# Patient Record
Sex: MALE | Race: WHITE | NOT HISPANIC OR LATINO | Employment: FULL TIME | URBAN - METROPOLITAN AREA
[De-identification: names, ages, dates, MRNs, and addresses within clinical notes are randomized per-mention and may not be internally consistent; named-entity substitution may affect disease eponyms.]

---

## 2018-03-14 ENCOUNTER — HOSPITAL ENCOUNTER (EMERGENCY)
Facility: HOSPITAL | Age: 53
Discharge: HOME | End: 2018-03-14
Attending: EMERGENCY MEDICINE

## 2018-03-14 VITALS
OXYGEN SATURATION: 96 % | SYSTOLIC BLOOD PRESSURE: 133 MMHG | WEIGHT: 125 LBS | RESPIRATION RATE: 16 BRPM | HEIGHT: 67 IN | BODY MASS INDEX: 19.62 KG/M2 | TEMPERATURE: 98 F | DIASTOLIC BLOOD PRESSURE: 84 MMHG | HEART RATE: 78 BPM

## 2018-03-14 DIAGNOSIS — R05.9 COUGH: ICD-10-CM

## 2018-03-14 DIAGNOSIS — R07.9 CHEST PAIN: ICD-10-CM

## 2018-03-14 DIAGNOSIS — R07.9 CHEST PAIN, UNSPECIFIED TYPE: Primary | ICD-10-CM

## 2018-03-14 LAB
ALBUMIN SERPL BCP-MCNC: 4.1 G/DL
ALP SERPL-CCNC: 45 U/L
ALT SERPL W/O P-5'-P-CCNC: 51 U/L
ANION GAP SERPL CALC-SCNC: 11 MMOL/L
AST SERPL-CCNC: 42 U/L
BASOPHILS # BLD AUTO: 0.03 K/UL
BASOPHILS NFR BLD: 0.4 %
BILIRUB SERPL-MCNC: 0.8 MG/DL
BUN SERPL-MCNC: 8 MG/DL
CALCIUM SERPL-MCNC: 9.8 MG/DL
CHLORIDE SERPL-SCNC: 102 MMOL/L
CO2 SERPL-SCNC: 26 MMOL/L
CREAT SERPL-MCNC: 0.7 MG/DL
D DIMER PPP IA.FEU-MCNC: 0.46 MG/L FEU
DIFFERENTIAL METHOD: ABNORMAL
EOSINOPHIL # BLD AUTO: 0.2 K/UL
EOSINOPHIL NFR BLD: 3 %
ERYTHROCYTE [DISTWIDTH] IN BLOOD BY AUTOMATED COUNT: 13.4 %
EST. GFR  (AFRICAN AMERICAN): >60 ML/MIN/1.73 M^2
EST. GFR  (NON AFRICAN AMERICAN): >60 ML/MIN/1.73 M^2
GLUCOSE SERPL-MCNC: 93 MG/DL
HCT VFR BLD AUTO: 47.1 %
HGB BLD-MCNC: 16.8 G/DL
LYMPHOCYTES # BLD AUTO: 3.1 K/UL
LYMPHOCYTES NFR BLD: 44.2 %
MCH RBC QN AUTO: 34.2 PG
MCHC RBC AUTO-ENTMCNC: 35.7 G/DL
MCV RBC AUTO: 96 FL
MONOCYTES # BLD AUTO: 0.6 K/UL
MONOCYTES NFR BLD: 9 %
NEUTROPHILS # BLD AUTO: 3.1 K/UL
NEUTROPHILS NFR BLD: 43.1 %
PLATELET # BLD AUTO: 203 K/UL
PMV BLD AUTO: 10.5 FL
POTASSIUM SERPL-SCNC: 3.9 MMOL/L
PROT SERPL-MCNC: 7.8 G/DL
RBC # BLD AUTO: 4.91 M/UL
SODIUM SERPL-SCNC: 139 MMOL/L
TROPONIN I SERPL DL<=0.01 NG/ML-MCNC: <0.006 NG/ML
WBC # BLD AUTO: 7.08 K/UL

## 2018-03-14 PROCEDURE — 93010 ELECTROCARDIOGRAM REPORT: CPT | Mod: 76,,, | Performed by: INTERNAL MEDICINE

## 2018-03-14 PROCEDURE — 93005 ELECTROCARDIOGRAM TRACING: CPT

## 2018-03-14 PROCEDURE — 85379 FIBRIN DEGRADATION QUANT: CPT

## 2018-03-14 PROCEDURE — 93010 ELECTROCARDIOGRAM REPORT: CPT | Mod: ,,, | Performed by: INTERNAL MEDICINE

## 2018-03-14 PROCEDURE — 80053 COMPREHEN METABOLIC PANEL: CPT

## 2018-03-14 PROCEDURE — 85025 COMPLETE CBC W/AUTO DIFF WBC: CPT

## 2018-03-14 PROCEDURE — 84484 ASSAY OF TROPONIN QUANT: CPT

## 2018-03-14 PROCEDURE — 25000003 PHARM REV CODE 250: Performed by: NURSE PRACTITIONER

## 2018-03-14 PROCEDURE — 99284 EMERGENCY DEPT VISIT MOD MDM: CPT

## 2018-03-14 RX ORDER — KETOROLAC TROMETHAMINE 10 MG/1
10 TABLET, FILM COATED ORAL
Status: COMPLETED | OUTPATIENT
Start: 2018-03-14 | End: 2018-03-14

## 2018-03-14 RX ADMIN — KETOROLAC TROMETHAMINE 10 MG: 10 TABLET, FILM COATED ORAL at 02:03

## 2018-03-14 NOTE — ED PROVIDER NOTES
"Encounter Date: 3/14/2018       History     Chief Complaint   Patient presents with    Chest Pain     c/o right-sided chest pain x1 week     Pt is a 52-year-old male with pmhx of alcohol abuse and Hep C who presents today with CP x 1 week. Pt reports R-sided CP that is constant and non-radiating. He denies any exacerbating or alleviating factors. He does report the pain worsens with certain movements.  He denies any cardiac hx but states that his mother had a heart attack last year. He denies any injury or trauma. He does report an associated symptom of a cough x 2 weeks, denies SOB. He is a smoker. Pt also reports drinking "3-4 whiskey and cokes on a daily basis." Admits to drinking one whiskey and coke this am. Pt denies dizziness, weakness, N/V/D, abdominal pain, leg swelling, and dysuria. Pt denies any other complaints at this time.        The history is provided by the patient.     Review of patient's allergies indicates:   Allergen Reactions    Penicillins Rash     Past Medical History:   Diagnosis Date    Alcohol abuse     Hepatitis C      History reviewed. No pertinent surgical history.  History reviewed. No pertinent family history.  Social History   Substance Use Topics    Smoking status: Current Every Day Smoker     Packs/day: 0.50     Types: Cigarettes    Smokeless tobacco: Never Used    Alcohol use 14.4 oz/week     24 Cans of beer per week      Comment: whiskey daily     Review of Systems   Constitutional: Negative for activity change, chills, diaphoresis and fever.   Respiratory: Positive for cough. Negative for chest tightness and shortness of breath.    Cardiovascular: Positive for chest pain. Negative for palpitations and leg swelling.   Gastrointestinal: Negative for abdominal pain, diarrhea, nausea and vomiting.   Genitourinary: Negative for difficulty urinating and dysuria.   Musculoskeletal: Negative for back pain and gait problem.   Skin: Negative for pallor and rash.   Neurological: " Negative for dizziness, syncope, weakness, light-headedness and headaches.   Hematological: Does not bruise/bleed easily.   All other systems reviewed and are negative.      Physical Exam     Initial Vitals [03/14/18 1213]   BP Pulse Resp Temp SpO2   133/89 89 20 97.7 °F (36.5 °C) 98 %      MAP       103.67         Physical Exam    Nursing note and vitals reviewed.  Constitutional: Vital signs are normal. He appears well-developed and well-nourished. He is not diaphoretic. He is active.  Non-toxic appearance. He does not have a sickly appearance. He does not appear ill. No distress.   HENT:   Head: Normocephalic.   Mouth/Throat: Oropharynx is clear and moist.   Eyes: Conjunctivae are normal.   Neck: Trachea normal, normal range of motion and phonation normal. Neck supple.   Cardiovascular: Normal rate, regular rhythm, normal heart sounds and normal pulses.   Pulses:       Radial pulses are 2+ on the right side, and 2+ on the left side.   Pulmonary/Chest: Effort normal and breath sounds normal. No respiratory distress. He has no decreased breath sounds. He has no wheezes. He has no rhonchi. He has no rales.   Abdominal: Soft. Normal appearance and bowel sounds are normal. There is no tenderness.   Musculoskeletal: Normal range of motion. He exhibits no edema or tenderness.   No calf pain or swelling   Neurological: He is alert and oriented to person, place, and time. He has normal strength. Gait normal. GCS eye subscore is 4. GCS verbal subscore is 5. GCS motor subscore is 6.   Skin: Skin is warm, dry and intact. Capillary refill takes less than 2 seconds. No rash noted.   Psychiatric: He has a normal mood and affect. His speech is normal and behavior is normal.         ED Course   Procedures  Labs Reviewed   CBC W/ AUTO DIFFERENTIAL   COMPREHENSIVE METABOLIC PANEL   TROPONIN I   D DIMER, QUANTITATIVE     EKG Readings: (Independently Interpreted)   Initial Reading: No STEMI. Rhythm: Normal Sinus Rhythm. Heart Rate:  83. Ectopy: No Ectopy. Clinical Impression: Normal Sinus Rhythm      Imaging Results          X-Ray Chest PA And Lateral (Final result)  Result time 03/14/18 14:21:55    Final result by Luciana Suarez MD (03/14/18 14:21:55)                 Impression:        No evidence of active cardiopulmonary disease.      Electronically signed by: LUCIANA SUAREZ MD  Date:     03/14/18  Time:    14:21              Narrative:    XR Chest    03/14/18 13:04:55    Accession# [id number]    CLINICAL INDICATION: 52 year old M with  cough    TECHNIQUE: PA and lateral radiographs of the chest.    COMPARISON:  6/1/14    FINDINGS:    Multiple overlying cardiac monitoring leads. The cardiomediastinal silhouette is normal in size and midline. Pulmonary vascularity appears within normal limits.    The lungs appear clear without confluent pulmonary parenchymal opacity. No pleural fluid.    Osseous structures appear intact.                              X-Rays:   Independently Interpreted Readings:   Chest X-Ray: Normal heart size.  No infiltrates.  No acute abnormalities.     Medical Decision Making:   Initial Assessment:   52-year-old male with pmhx of alcohol abuse and Hep C who presents today with right sided cp x 1 week. Pt non-ill, non-toxic appearing. Pt in NAD. Normal cardiac exam. Normal vital signs. No edema noted. Lungs CTA. No respiratory distress noted.  Differential Diagnosis:   Costochondritis, strain, spasm, Pneumonia, pneumothorax, pericarditis, pulmonary embolism, AAA, dysrhythmia, STEMI, non-STEMI   Independently Interpreted Test(s):   I have ordered and independently interpreted X-rays - see prior notes.  I have ordered and independently interpreted EKG Reading(s) - see prior notes  Clinical Tests:   Lab Tests: Ordered and Reviewed  Radiological Study: Ordered and Reviewed  Medical Tests: Ordered and Reviewed  ED Management:  Chest xray, EKG, labs, toradol, IV  EKG, labs, and chest xray normal. Pt states that the pain has  ""eased up" with toradol. Pt's mid-sternal CP most likely musculoskeletal. Pt instructed to take ibuprofen/tylenol as labeled and as needed for pain. Pt instructed to f/u with PCP within 2-3 days and return to ED if the symptoms worsen or change, such as increased chest pain, shortness of breath, or if pt cannot keep any fluids down. Pt verbalized d/c instructions and is in compliance and agreement with tx plan.    Additional MDM:   Heart Score:    History:          Slightly suspicious.  ECG:             Normal  Age:               45-65 years  Risk factors: 1-2 risk factors  Troponin:       Less than or equal to normal limit  Final Score: 2               Attending Attestation:     Physician Attestation Statement for NP/PA:   I have conducted a face to face encounter with this patient in addition to the NP/PA, due to    Other NP/PA Attestation Additions:    History of Present Illness: Agree:  Right sided chest pain that has been intermittent for the last week.  Pt reports the pain has been lasting atleast 8 hours per episode and will improve at night.  The pain returned last night and has been present since then with no pain free intervals.  No sob, wheezing, diaphoresis, palpitations, hemoptysis.  Pt reports "it feels like a muscle."     Physical Exam: agree   Medical Decision Making: Agree:  Pt work up in ED is neg for acute changes.  His pain has been constant since last night and he has no EKG changes and neg trop.  For these reasons, I do not feel his pain is cardiac in nature. He feels better after toradol.  He is established with [facility] but has not recently been seen.  He will call today to schedule an outpatient appt for f/u.  He understands that he must return to ED immediately with any worsening of symptoms.                      Clinical Impression:   The primary encounter diagnosis was Chest pain, unspecified type. Diagnoses of Cough and Chest pain were also pertinent to this " visit.    Disposition:   Disposition: Discharged  Condition: Stable                        Lowell Krueger NP  03/14/18 150       Eufemia Chen MD  03/14/18 2406

## 2018-03-14 NOTE — ED PROVIDER NOTES
Encounter Date: 3/14/2018    SCRIBE #1 NOTE: I, Jorge Alvarez, am scribing for, and in the presence of,  Dr. Eufemia Chen. I have scribed the entire note.       History     Chief Complaint   Patient presents with    Chest Pain     c/o right-sided chest pain x1 week     HPI  Review of patient's allergies indicates:   Allergen Reactions    Penicillins Rash     Past Medical History:   Diagnosis Date    Alcohol abuse     Hepatitis C      History reviewed. No pertinent surgical history.  No family history on file.  Social History   Substance Use Topics    Smoking status: Current Every Day Smoker     Packs/day: 0.50     Types: Cigarettes    Smokeless tobacco: Never Used    Alcohol use 14.4 oz/week     24 Cans of beer per week      Comment: whiskey daily     Review of Systems   Constitutional: Negative for fever.   HENT: Negative for sore throat.    Respiratory: Negative for shortness of breath.    Cardiovascular: Negative for chest pain.   Gastrointestinal: Negative for nausea.   Genitourinary: Negative for dysuria.   Musculoskeletal: Negative for back pain.   Skin: Negative for rash.   Neurological: Negative for weakness.   Hematological: Does not bruise/bleed easily.       Physical Exam     Initial Vitals [03/14/18 1213]   BP Pulse Resp Temp SpO2   133/89 89 20 97.7 °F (36.5 °C) 98 %      MAP       103.67         Physical Exam    Nursing note and vitals reviewed.  Constitutional: He appears well-developed and well-nourished. He is not diaphoretic. No distress.   HENT:   Head: Normocephalic and atraumatic.   Eyes: Conjunctivae are normal.   Neck: Normal range of motion. Neck supple.   Pulmonary/Chest: No respiratory distress.   Abdominal: He exhibits no distension.   Musculoskeletal: Normal range of motion.   Neurological: He is alert and oriented to person, place, and time.   Skin: Skin is dry.   Psychiatric: He has a normal mood and affect.         ED Course   Procedures  Labs Reviewed - No data to display    "                    Attending Attestation:     Physician Attestation Statement for NP/PA:   I have conducted a face to face encounter with this patient in addition to the NP/PA, due to Medical Complexity    Other NP/PA Attestation Additions:    History of Present Illness: Agree:  Right sided chest pain that has been intermittent for the last week.  Pt reports the pain has been lasting atleast 8 hours per episode and will improve at night.  The pain returned last night and has been present since then with no pain free intervals.  No sob, wheezing, diaphoresis, palpitations, hemoptysis.  Pt reports "it feels like a muscle."    Physical Exam: Agree   Medical Decision Making: Agree                    Clinical Impression:   There were no encounter diagnoses.     ***                   "

## 2018-03-14 NOTE — DISCHARGE INSTRUCTIONS
Please take ibuprofen/tylenol as labeled and as needed for pain. Please follow-up with your PCP within 2-3 days and return if symptoms worsen or change, such as increased chest pain, shortness of breath, or if you cannot keep fluids down.

## 2018-03-15 DIAGNOSIS — R07.9 CHEST PAIN: Primary | ICD-10-CM

## 2020-06-15 ENCOUNTER — HOSPITAL ENCOUNTER (EMERGENCY)
Facility: HOSPITAL | Age: 55
Discharge: HOME | End: 2020-06-15
Attending: EMERGENCY MEDICINE
Payer: MEDICAID

## 2020-06-15 VITALS
SYSTOLIC BLOOD PRESSURE: 132 MMHG | TEMPERATURE: 99 F | BODY MASS INDEX: 22.2 KG/M2 | DIASTOLIC BLOOD PRESSURE: 81 MMHG | HEART RATE: 95 BPM | HEIGHT: 64 IN | OXYGEN SATURATION: 95 % | RESPIRATION RATE: 18 BRPM | WEIGHT: 130 LBS

## 2020-06-15 DIAGNOSIS — S81.811A LACERATION OF RIGHT LOWER EXTREMITY, INITIAL ENCOUNTER: Primary | ICD-10-CM

## 2020-06-15 PROCEDURE — 90715 TDAP VACCINE 7 YRS/> IM: CPT | Performed by: PHYSICIAN ASSISTANT

## 2020-06-15 PROCEDURE — 63600175 PHARM REV CODE 636 W HCPCS: Performed by: PHYSICIAN ASSISTANT

## 2020-06-15 PROCEDURE — 90471 IMMUNIZATION ADMIN: CPT | Performed by: PHYSICIAN ASSISTANT

## 2020-06-15 PROCEDURE — 99284 EMERGENCY DEPT VISIT MOD MDM: CPT | Mod: 25

## 2020-06-15 RX ORDER — MUPIROCIN 20 MG/G
OINTMENT TOPICAL 3 TIMES DAILY
Qty: 15 G | Refills: 0 | Status: SHIPPED | OUTPATIENT
Start: 2020-06-15

## 2020-06-15 RX ADMIN — CLOSTRIDIUM TETANI TOXOID ANTIGEN (FORMALDEHYDE INACTIVATED), CORYNEBACTERIUM DIPHTHERIAE TOXOID ANTIGEN (FORMALDEHYDE INACTIVATED), BORDETELLA PERTUSSIS TOXOID ANTIGEN (GLUTARALDEHYDE INACTIVATED), BORDETELLA PERTUSSIS FILAMENTOUS HEMAGGLUTININ ANTIGEN (FORMALDEHYDE INACTIVATED), BORDETELLA PERTUSSIS PERTACTIN ANTIGEN, AND BORDETELLA PERTUSSIS FIMBRIAE 2/3 ANTIGEN 0.5 ML: 5; 2; 2.5; 5; 3; 5 INJECTION, SUSPENSION INTRAMUSCULAR at 08:06

## 2020-06-15 NOTE — Clinical Note
Armando Green was seen and treated in our emergency department on 6/15/2020.  He may return to work on 06/16/2020.       If you have any questions or concerns, please don't hesitate to call.      MajoRN RN

## 2020-06-15 NOTE — Clinical Note
Armando Green was seen and treated in our emergency department on 6/15/2020.  He may return to work on 06/16/2020.       If you have any questions or concerns, please don't hesitate to call.      Betsy Schmitt PA-C

## 2020-06-15 NOTE — ED TRIAGE NOTES
pt hit his right shin on a picture frame 3 days ago and has been cleaning site with peroxide and applying neosporin. concerned because site is still red and mildly swollen.

## 2020-06-15 NOTE — ED PROVIDER NOTES
Encounter Date: 6/15/2020       History     Chief Complaint   Patient presents with    Leg Injury     pt hit his right shin on a picture frame 3 days ago and has been cleaning site with peroxide and applying neosporin. concerned because site is still red and mildly swollen     Armando Green, a 55 y.o. male  has a past medical history of Alcohol abuse and Hepatitis C.     He presents to the ED evaluation of laceration sustained to the right lower leg after a picture frame fell to site.  States he's been treating at home with peroixde and OTC antibiotic creams.  Concerned because there is some redness to site.  Denies drainage, fevers, swelling.  Unsure of tetanus status.  Requesting     The history is provided by the patient.     Review of patient's allergies indicates:   Allergen Reactions    Penicillins Rash     Past Medical History:   Diagnosis Date    Alcohol abuse     Hepatitis C      Past Surgical History:   Procedure Laterality Date    OTHER SURGICAL HISTORY      right arm surgery     No family history on file.  Social History     Tobacco Use    Smoking status: Current Every Day Smoker     Packs/day: 0.50     Types: Cigarettes    Smokeless tobacco: Never Used   Substance Use Topics    Alcohol use: Yes     Alcohol/week: 24.0 standard drinks     Types: 24 Cans of beer per week     Comment: whiskey daily    Drug use: No     Comment: former pill abuser; denies IVDA     Review of Systems   Constitutional: Negative for fever.   Musculoskeletal: Negative for arthralgias and myalgias.   Skin: Positive for color change and wound.   Allergic/Immunologic: Negative for immunocompromised state.   Hematological: Does not bruise/bleed easily.       Physical Exam     Initial Vitals [06/15/20 0806]   BP Pulse Resp Temp SpO2   132/81 95 18 98.5 °F (36.9 °C) 95 %      MAP       --         Physical Exam    Nursing note and vitals reviewed.  Constitutional: He appears well-developed and well-nourished.   HENT:   Head:  Normocephalic and atraumatic.   Right Ear: External ear normal.   Left Ear: External ear normal.   Eyes: EOM are normal.   Neck: Normal range of motion.   Cardiovascular: Normal rate and regular rhythm.   Pulmonary/Chest: No respiratory distress.   Musculoskeletal: Normal range of motion. No tenderness or edema.   Neurological: He is alert and oriented to person, place, and time. No cranial nerve deficit.   Skin: Skin is warm and dry. Capillary refill takes less than 2 seconds. Laceration noted. No rash and no abscess noted.        Healing linear laceration to right anterior chin.  No drainage or fluctuance.  Very mild erythema    Psychiatric: He has a normal mood and affect. Thought content normal.         ED Course   Procedures  Labs Reviewed - No data to display       Imaging Results    None          Medical Decision Making:   Initial Assessment:   Laceration   Differential Diagnosis:   Laceration simple vs complex vs infected   ED Management:  Pt presents to ED for evaluation of laceration sustained 3 days go.  No significant erythema.  No induration or drainage.  Patient was given information on wound care and given RX for Bactroban.  Tetanus was updated.  Patient requesting work note for previous 3 days, which was declined.  Work note given for today.                                   Clinical Impression:       ICD-10-CM ICD-9-CM   1. Laceration of right lower extremity, initial encounter  S81.811A 894.0             ED Disposition Condition    Discharge Stable        ED Prescriptions     Medication Sig Dispense Start Date End Date Auth. Provider    mupirocin (BACTROBAN) 2 % ointment Apply topically 3 (three) times daily. 15 g 6/15/2020  Betsy Schmitt PA-C        Follow-up Information     Follow up With Specialties Details Why Contact Info    Rick Musa MD Family Medicine   [address] [place] [address] [place] [address]  [phone]                                       Betsy Schmitt PA-C  06/15/20 5398

## 2020-08-06 ENCOUNTER — HOSPITAL ENCOUNTER (EMERGENCY)
Facility: HOSPITAL | Age: 55
Discharge: HOME | End: 2020-08-06
Attending: EMERGENCY MEDICINE
Payer: MEDICAID

## 2020-08-06 VITALS
DIASTOLIC BLOOD PRESSURE: 82 MMHG | SYSTOLIC BLOOD PRESSURE: 134 MMHG | OXYGEN SATURATION: 95 % | BODY MASS INDEX: 23.32 KG/M2 | TEMPERATURE: 99 F | WEIGHT: 140 LBS | HEART RATE: 78 BPM | HEIGHT: 65 IN | RESPIRATION RATE: 20 BRPM

## 2020-08-06 DIAGNOSIS — F10.10 ALCOHOL ABUSE: ICD-10-CM

## 2020-08-06 DIAGNOSIS — F10.930 ALCOHOL WITHDRAWAL SYNDROME WITHOUT COMPLICATION: ICD-10-CM

## 2020-08-06 DIAGNOSIS — F10.20 ALCOHOLISM: Primary | ICD-10-CM

## 2020-08-06 LAB
ALBUMIN SERPL BCP-MCNC: 3.6 G/DL (ref 3.5–5.2)
ALP SERPL-CCNC: 64 U/L (ref 55–135)
ALT SERPL W/O P-5'-P-CCNC: 96 U/L (ref 10–44)
ANION GAP SERPL CALC-SCNC: 11 MMOL/L (ref 8–16)
AST SERPL-CCNC: 116 U/L (ref 10–40)
BASOPHILS # BLD AUTO: 0.04 K/UL (ref 0–0.2)
BASOPHILS NFR BLD: 0.6 % (ref 0–1.9)
BILIRUB SERPL-MCNC: 1.4 MG/DL (ref 0.1–1)
BUN SERPL-MCNC: 20 MG/DL (ref 6–20)
CALCIUM SERPL-MCNC: 9.2 MG/DL (ref 8.7–10.5)
CHLORIDE SERPL-SCNC: 102 MMOL/L (ref 95–110)
CO2 SERPL-SCNC: 29 MMOL/L (ref 23–29)
CREAT SERPL-MCNC: 0.6 MG/DL (ref 0.5–1.4)
DIFFERENTIAL METHOD: ABNORMAL
EOSINOPHIL # BLD AUTO: 0.2 K/UL (ref 0–0.5)
EOSINOPHIL NFR BLD: 2.8 % (ref 0–8)
ERYTHROCYTE [DISTWIDTH] IN BLOOD BY AUTOMATED COUNT: 13 % (ref 11.5–14.5)
EST. GFR  (AFRICAN AMERICAN): >60 ML/MIN/1.73 M^2
EST. GFR  (NON AFRICAN AMERICAN): >60 ML/MIN/1.73 M^2
ETHANOL SERPL-MCNC: <10 MG/DL
GLUCOSE SERPL-MCNC: 167 MG/DL (ref 70–110)
HCT VFR BLD AUTO: 41.1 % (ref 40–54)
HGB BLD-MCNC: 14.5 G/DL (ref 14–18)
IMM GRANULOCYTES # BLD AUTO: 0.02 K/UL (ref 0–0.04)
IMM GRANULOCYTES NFR BLD AUTO: 0.3 % (ref 0–0.5)
LIPASE SERPL-CCNC: 28 U/L (ref 4–60)
LYMPHOCYTES # BLD AUTO: 0.6 K/UL (ref 1–4.8)
LYMPHOCYTES NFR BLD: 8.4 % (ref 18–48)
MAGNESIUM SERPL-MCNC: 1.7 MG/DL (ref 1.6–2.6)
MCH RBC QN AUTO: 34.5 PG (ref 27–31)
MCHC RBC AUTO-ENTMCNC: 35.3 G/DL (ref 32–36)
MCV RBC AUTO: 98 FL (ref 82–98)
MONOCYTES # BLD AUTO: 0.6 K/UL (ref 0.3–1)
MONOCYTES NFR BLD: 9.2 % (ref 4–15)
NEUTROPHILS # BLD AUTO: 5.3 K/UL (ref 1.8–7.7)
NEUTROPHILS NFR BLD: 78.7 % (ref 38–73)
NRBC BLD-RTO: 0 /100 WBC
PLATELET # BLD AUTO: 133 K/UL (ref 150–350)
PMV BLD AUTO: 10.4 FL (ref 9.2–12.9)
POTASSIUM SERPL-SCNC: 3.7 MMOL/L (ref 3.5–5.1)
PROT SERPL-MCNC: 7.4 G/DL (ref 6–8.4)
RBC # BLD AUTO: 4.2 M/UL (ref 4.6–6.2)
SODIUM SERPL-SCNC: 142 MMOL/L (ref 136–145)
WBC # BLD AUTO: 6.77 K/UL (ref 3.9–12.7)

## 2020-08-06 PROCEDURE — 85025 COMPLETE CBC W/AUTO DIFF WBC: CPT

## 2020-08-06 PROCEDURE — 83690 ASSAY OF LIPASE: CPT

## 2020-08-06 PROCEDURE — 80320 DRUG SCREEN QUANTALCOHOLS: CPT

## 2020-08-06 PROCEDURE — 96374 THER/PROPH/DIAG INJ IV PUSH: CPT

## 2020-08-06 PROCEDURE — 99284 EMERGENCY DEPT VISIT MOD MDM: CPT | Mod: 25

## 2020-08-06 PROCEDURE — 96361 HYDRATE IV INFUSION ADD-ON: CPT

## 2020-08-06 PROCEDURE — 80053 COMPREHEN METABOLIC PANEL: CPT

## 2020-08-06 PROCEDURE — 25000003 PHARM REV CODE 250: Performed by: EMERGENCY MEDICINE

## 2020-08-06 PROCEDURE — 63600175 PHARM REV CODE 636 W HCPCS: Performed by: EMERGENCY MEDICINE

## 2020-08-06 PROCEDURE — 83735 ASSAY OF MAGNESIUM: CPT

## 2020-08-06 RX ORDER — CHLORDIAZEPOXIDE HYDROCHLORIDE 25 MG/1
50 CAPSULE, GELATIN COATED ORAL ONCE
Status: COMPLETED | OUTPATIENT
Start: 2020-08-06 | End: 2020-08-06

## 2020-08-06 RX ORDER — SODIUM CHLORIDE 9 MG/ML
100 INJECTION, SOLUTION INTRAVENOUS CONTINUOUS
Status: DISCONTINUED | OUTPATIENT
Start: 2020-08-06 | End: 2020-08-06 | Stop reason: HOSPADM

## 2020-08-06 RX ORDER — CHLORDIAZEPOXIDE HYDROCHLORIDE 25 MG/1
CAPSULE, GELATIN COATED ORAL
Qty: 24 CAPSULE | Refills: 0 | Status: SHIPPED | OUTPATIENT
Start: 2020-08-06 | End: 2020-08-12

## 2020-08-06 RX ORDER — CHLORDIAZEPOXIDE HYDROCHLORIDE 25 MG/1
CAPSULE, GELATIN COATED ORAL
Qty: 24 CAPSULE | Refills: 0 | Status: SHIPPED | OUTPATIENT
Start: 2020-08-06 | End: 2020-08-06 | Stop reason: SDUPTHER

## 2020-08-06 RX ADMIN — SODIUM CHLORIDE 1000 ML: 0.9 INJECTION, SOLUTION INTRAVENOUS at 03:08

## 2020-08-06 RX ADMIN — LORAZEPAM 2 MG: 2 INJECTION INTRAMUSCULAR; INTRAVENOUS at 03:08

## 2020-08-06 RX ADMIN — CHLORDIAZEPOXIDE HYDROCHLORIDE 50 MG: 25 CAPSULE ORAL at 04:08

## 2020-08-06 NOTE — ED PROVIDER NOTES
Encounter Date: 8/6/2020       History     Chief Complaint   Patient presents with    Alcohol Problem     pt is attempting to detox from alcohol, and last drank yesterday at 1600. c/o tremors today and vomited on arrival to the ER.      55-year-old man who presents for evaluation of tremulousness.  He has a history significant for alcoholism daily dependency and has daily tremulousness upon stopping drinking.  He denies any seizures in the past other injuries other illnesses at this time.  He does wish to stop drinking.  He has never tried to stop drinking in the past.  His son notes that he is trying to get him to quit drinking because he is worried about him.        Review of patient's allergies indicates:   Allergen Reactions    Penicillins Rash     Past Medical History:   Diagnosis Date    Alcohol abuse     Hepatitis C      Past Surgical History:   Procedure Laterality Date    OTHER SURGICAL HISTORY      right arm surgery     No family history on file.  Social History     Tobacco Use    Smoking status: Current Every Day Smoker     Packs/day: 0.50     Types: Cigarettes    Smokeless tobacco: Never Used   Substance Use Topics    Alcohol use: Yes     Alcohol/week: 24.0 standard drinks     Types: 24 Cans of beer per week     Comment: whiskey daily    Drug use: No     Comment: former pill abuser; denies IVDA     Review of Systems  Constitutional-no fever no chills  HEENT-no congestion, no ear pain, no nose bleed, no sinus pain,  Eyes-no discharge, no itching, no redness, no visual change  Respiratory-no apnea, no chest tightness, no choking, no cough, no shortness of breath, no wheezing  Cardio-no chest pain  GI-no distention, no abdominal pain, no diarrhea, no constipation  Endocrine-no cold intolerance, no heat intolerance  -no difficulty urinating, no dysuria, no flank pain,  MSK-no arthralgias, no joint swelling, no myalgias  Skin-no rashes  Allergy-no environmental allergy  Neurologic-no dizziness,  positive jitteriness  Hematology-no swollen nodes  Behavioral-no confusion, no hallucinations, no nervousness  Physical Exam     Initial Vitals [08/06/20 1419]   BP Pulse Resp Temp SpO2   (!) 157/85 76 18 99.1 °F (37.3 °C) 95 %      MAP       --         Physical Exam  Constitutional: Well appearing, no distress.  Eyes: Conjunctivae normal.  ENT       Head: Normocephalic, atraumatic.       Nose: No congestion.       Mouth/Throat: Mucous membranes are moist.  Hematological/Lymphatic/Immunilogical: No cervical lymphadenopathy.  Cardiovascular: Normal rate, regular rhythm. Normal and symmetric distal pulses.  Respiratory: Normal respiratory effort. Breath sounds are normal.  Gastrointestinal: Soft, nontender.   Musculoskeletal: Normal range of motion in all extremities. No obvious deformities or swelling.  Neurologic: Alert, oriented. Normal speech and language. No gross focal neurologic deficits are appreciated.  Skin: Skin is warm, dry. No rash noted.  Psychiatric: Mood and affect are normal.   ED Course   Procedures  Labs Reviewed   CBC W/ AUTO DIFFERENTIAL - Abnormal; Notable for the following components:       Result Value    RBC 4.20 (*)     Mean Corpuscular Hemoglobin 34.5 (*)     Platelets 133 (*)     Lymph # 0.6 (*)     Gran% 78.7 (*)     Lymph% 8.4 (*)     All other components within normal limits   COMPREHENSIVE METABOLIC PANEL - Abnormal; Notable for the following components:    Glucose 167 (*)     Total Bilirubin 1.4 (*)      (*)     ALT 96 (*)     All other components within normal limits   MAGNESIUM   LIPASE   ALCOHOL,MEDICAL (ETHANOL)   DRUG SCREEN PANEL, URINE EMERGENCY   POCT GLUCOSE MONITORING CONTINUOUS          Imaging Results    None          Medical Decision Making:   Initial Assessment:   55-year-old man with tremulousness after not drinking since yesterday  Differential Diagnosis:   Alcohol withdrawal,  Clinical Tests:   Lab Tests: Ordered and Reviewed  ED Management:  This gentleman's  tremulousness improved with the administration of Ativan, he had reassuring hemodynamics, I discussed with his son outpatient management including possible follow-up for alcohol cessation.                   ED Course as of Aug 06 1921   Thu Aug 06, 2020   1612 Called son [phone], he notes he was drinking yesterday. Is a heavy alcoholic. Drinks til he passes out.    [TK]      ED Course User Index  [TK] Bernabe Deras MD                Clinical Impression:       ICD-10-CM ICD-9-CM   1. Alcoholism  F10.20 303.90   2. Alcohol withdrawal syndrome without complication  F10.230 291.81   3. Alcohol abuse  F10.10 305.00             ED Disposition Condition    Discharge Stable        ED Prescriptions     Medication Sig Dispense Start Date End Date Auth. Provider    chlordiazepoxide (LIBRIUM) 25 MG Cap  (Status: Discontinued) Take 2 capsules (50 mg total) by mouth 4 (four) times daily for 1 day, THEN 2 capsules (50 mg total) 3 (three) times daily for 1 day, THEN 1 capsule (25 mg total) 4 (four) times daily for 1 day, THEN 1 capsule (25 mg total) 3 (three) times daily for 1 day, THEN 1 capsule (25 mg total) 2 (two) times a day for 1 day, THEN 1 capsule (25 mg total) every evening for 1 day. 24 capsule 8/6/2020 8/6/2020 Bernabe Deras MD    chlordiazepoxide (LIBRIUM) 25 MG Cap Take 2 capsules (50 mg total) by mouth 4 (four) times daily for 1 day, THEN 2 capsules (50 mg total) 3 (three) times daily for 1 day, THEN 1 capsule (25 mg total) 4 (four) times daily for 1 day, THEN 1 capsule (25 mg total) 3 (three) times daily for 1 day, THEN 1 capsule (25 mg total) 2 (two) times a day for 1 day, THEN 1 capsule (25 mg total) every evening for 1 day. 24 capsule 8/6/2020 8/12/2020 Bernabe Deras MD        Follow-up Information     Follow up With Specialties Details Why Contact Info    Rick Musa MD Family Medicine Call in 1 day If symptoms worsen, For a follow up visit about today [address] [facility] [place]  [address]  [phone]                                       Bernabe Deras MD  08/06/20 1923

## 2020-08-06 NOTE — ED NOTES
Pt is attempting to detox from alcohol, and last drank yesterday at 1600. C/o tremors today and vomited on arrival to the ER. Pt states nausea has now resolved. Very tremulous on arrival. Denies pain. Denies SI.      Evelyne Harley RN  08/06/20 3253

## 2021-05-04 ENCOUNTER — HOSPITAL ENCOUNTER (EMERGENCY)
Facility: HOSPITAL | Age: 56
Discharge: HOME | End: 2021-05-04
Attending: EMERGENCY MEDICINE
Payer: MEDICAID

## 2021-05-04 VITALS
DIASTOLIC BLOOD PRESSURE: 66 MMHG | HEIGHT: 65 IN | TEMPERATURE: 98 F | RESPIRATION RATE: 17 BRPM | OXYGEN SATURATION: 99 % | WEIGHT: 130 LBS | BODY MASS INDEX: 21.66 KG/M2 | HEART RATE: 75 BPM | SYSTOLIC BLOOD PRESSURE: 105 MMHG

## 2021-05-04 DIAGNOSIS — K40.90 RIGHT INGUINAL HERNIA: Primary | ICD-10-CM

## 2021-05-04 LAB
BILIRUB UR QL STRIP: NEGATIVE
CLARITY UR: CLEAR
COLOR UR: YELLOW
GLUCOSE UR QL STRIP: NEGATIVE
HGB UR QL STRIP: NEGATIVE
KETONES UR QL STRIP: NEGATIVE
LEUKOCYTE ESTERASE UR QL STRIP: NEGATIVE
NITRITE UR QL STRIP: NEGATIVE
PH UR STRIP: 5 [PH] (ref 5–8)
PROT UR QL STRIP: ABNORMAL
SP GR UR STRIP: 1.03 (ref 1–1.03)
URN SPEC COLLECT METH UR: ABNORMAL
UROBILINOGEN UR STRIP-ACNC: NEGATIVE EU/DL

## 2021-05-04 PROCEDURE — 81003 URINALYSIS AUTO W/O SCOPE: CPT | Performed by: EMERGENCY MEDICINE

## 2021-05-04 PROCEDURE — 99283 EMERGENCY DEPT VISIT LOW MDM: CPT

## 2021-05-05 ENCOUNTER — TELEPHONE (OUTPATIENT)
Dept: SURGERY | Facility: CLINIC | Age: 56
End: 2021-05-05

## 2021-05-11 ENCOUNTER — TELEPHONE (OUTPATIENT)
Dept: SURGERY | Facility: CLINIC | Age: 56
End: 2021-05-11

## 2021-05-12 ENCOUNTER — TELEPHONE (OUTPATIENT)
Dept: SURGERY | Facility: CLINIC | Age: 56
End: 2021-05-12

## 2021-05-14 ENCOUNTER — IMMUNIZATION (OUTPATIENT)
Dept: INTERNAL MEDICINE | Facility: CLINIC | Age: 56
End: 2021-05-14
Payer: MEDICAID

## 2021-05-14 DIAGNOSIS — Z23 NEED FOR VACCINATION: Primary | ICD-10-CM

## 2021-05-14 PROCEDURE — 0011A COVID-19, MRNA, LNP-S, PF, 100 MCG/0.5 ML DOSE VACCINE: CPT | Mod: PBBFAC,PO

## 2021-05-17 ENCOUNTER — OFFICE VISIT (OUTPATIENT)
Dept: SURGERY | Facility: CLINIC | Age: 56
End: 2021-05-17
Payer: MEDICAID

## 2021-05-17 VITALS
BODY MASS INDEX: 23.69 KG/M2 | DIASTOLIC BLOOD PRESSURE: 72 MMHG | WEIGHT: 142.19 LBS | HEART RATE: 74 BPM | HEIGHT: 65 IN | SYSTOLIC BLOOD PRESSURE: 116 MMHG

## 2021-05-17 DIAGNOSIS — K40.90 UNILATERAL INGUINAL HERNIA WITHOUT OBSTRUCTION OR GANGRENE, RECURRENCE NOT SPECIFIED: Primary | ICD-10-CM

## 2021-05-17 DIAGNOSIS — K40.90 RIGHT INGUINAL HERNIA: Primary | ICD-10-CM

## 2021-05-17 PROCEDURE — 99999 PR PBB SHADOW E&M-EST. PATIENT-LVL III: ICD-10-PCS | Mod: PBBFAC,,, | Performed by: SURGERY

## 2021-05-17 PROCEDURE — 99213 OFFICE O/P EST LOW 20 MIN: CPT | Mod: PBBFAC,PN | Performed by: SURGERY

## 2021-05-17 PROCEDURE — 99204 OFFICE O/P NEW MOD 45 MIN: CPT | Mod: S$PBB,,, | Performed by: SURGERY

## 2021-05-17 PROCEDURE — 99999 PR PBB SHADOW E&M-EST. PATIENT-LVL III: CPT | Mod: PBBFAC,,, | Performed by: SURGERY

## 2021-05-17 PROCEDURE — 99204 PR OFFICE/OUTPT VISIT, NEW, LEVL IV, 45-59 MIN: ICD-10-PCS | Mod: S$PBB,,, | Performed by: SURGERY

## 2021-05-17 RX ORDER — CALCIUM CARBONATE/VITAMIN D3 600 MG-10
100 TABLET ORAL DAILY
COMMUNITY
Start: 2021-04-09

## 2021-05-17 RX ORDER — DIVALPROEX SODIUM 500 MG/1
500 TABLET, DELAYED RELEASE ORAL NIGHTLY
COMMUNITY
Start: 2021-05-05

## 2021-05-17 RX ORDER — SODIUM CHLORIDE 9 MG/ML
INJECTION, SOLUTION INTRAVENOUS CONTINUOUS
Status: CANCELLED | OUTPATIENT
Start: 2021-05-17

## 2021-05-17 RX ORDER — OLANZAPINE 15 MG/1
15 TABLET ORAL NIGHTLY
COMMUNITY
Start: 2021-05-05

## 2021-05-28 ENCOUNTER — HOSPITAL ENCOUNTER (OUTPATIENT)
Dept: PREADMISSION TESTING | Facility: HOSPITAL | Age: 56
Discharge: HOME | End: 2021-05-28
Attending: SURGERY
Payer: MEDICAID

## 2021-05-28 ENCOUNTER — ANESTHESIA EVENT (OUTPATIENT)
Dept: SURGERY | Facility: HOSPITAL | Age: 56
End: 2021-05-28
Payer: MEDICAID

## 2021-05-28 VITALS
TEMPERATURE: 99 F | OXYGEN SATURATION: 96 % | HEART RATE: 85 BPM | BODY MASS INDEX: 28.42 KG/M2 | RESPIRATION RATE: 20 BRPM | DIASTOLIC BLOOD PRESSURE: 68 MMHG | WEIGHT: 144.75 LBS | SYSTOLIC BLOOD PRESSURE: 118 MMHG | HEIGHT: 60 IN

## 2021-05-28 DIAGNOSIS — R76.8 HEPATITIS C ANTIBODY TEST POSITIVE: ICD-10-CM

## 2021-05-28 DIAGNOSIS — Z01.818 PREOP TESTING: Primary | ICD-10-CM

## 2021-05-28 RX ORDER — IBUPROFEN 600 MG/1
600 TABLET ORAL DAILY
COMMUNITY

## 2021-05-28 RX ORDER — SODIUM CHLORIDE, SODIUM LACTATE, POTASSIUM CHLORIDE, CALCIUM CHLORIDE 600; 310; 30; 20 MG/100ML; MG/100ML; MG/100ML; MG/100ML
INJECTION, SOLUTION INTRAVENOUS CONTINUOUS
Status: CANCELLED | OUTPATIENT
Start: 2021-05-28

## 2021-05-28 RX ORDER — LIDOCAINE HYDROCHLORIDE 10 MG/ML
1 INJECTION, SOLUTION EPIDURAL; INFILTRATION; INTRACAUDAL; PERINEURAL ONCE
Status: CANCELLED | OUTPATIENT
Start: 2021-05-28 | End: 2021-05-28

## 2021-06-04 ENCOUNTER — LAB VISIT (OUTPATIENT)
Dept: FAMILY MEDICINE | Facility: CLINIC | Age: 56
End: 2021-06-04
Payer: MEDICAID

## 2021-06-04 DIAGNOSIS — K40.90 UNILATERAL INGUINAL HERNIA WITHOUT OBSTRUCTION OR GANGRENE, RECURRENCE NOT SPECIFIED: ICD-10-CM

## 2021-06-04 PROCEDURE — U0005 INFEC AGEN DETEC AMPLI PROBE: HCPCS | Performed by: SURGERY

## 2021-06-04 PROCEDURE — U0003 INFECTIOUS AGENT DETECTION BY NUCLEIC ACID (DNA OR RNA); SEVERE ACUTE RESPIRATORY SYNDROME CORONAVIRUS 2 (SARS-COV-2) (CORONAVIRUS DISEASE [COVID-19]), AMPLIFIED PROBE TECHNIQUE, MAKING USE OF HIGH THROUGHPUT TECHNOLOGIES AS DESCRIBED BY CMS-2020-01-R: HCPCS | Performed by: SURGERY

## 2021-06-05 LAB — SARS-COV-2 RNA RESP QL NAA+PROBE: NOT DETECTED

## 2021-06-07 ENCOUNTER — ANESTHESIA (OUTPATIENT)
Dept: SURGERY | Facility: HOSPITAL | Age: 56
End: 2021-06-07
Payer: MEDICAID

## 2021-06-07 ENCOUNTER — HOSPITAL ENCOUNTER (OUTPATIENT)
Facility: HOSPITAL | Age: 56
Discharge: HOME | End: 2021-06-07
Attending: SURGERY | Admitting: SURGERY
Payer: MEDICAID

## 2021-06-07 VITALS
TEMPERATURE: 98 F | SYSTOLIC BLOOD PRESSURE: 110 MMHG | BODY MASS INDEX: 22.49 KG/M2 | DIASTOLIC BLOOD PRESSURE: 63 MMHG | HEART RATE: 73 BPM | OXYGEN SATURATION: 96 % | HEIGHT: 65 IN | WEIGHT: 135 LBS | RESPIRATION RATE: 17 BRPM

## 2021-06-07 DIAGNOSIS — K40.90 RIGHT INGUINAL HERNIA: Primary | ICD-10-CM

## 2021-06-07 LAB
ALBUMIN SERPL BCP-MCNC: 3.9 G/DL (ref 3.5–5.2)
ALP SERPL-CCNC: 63 U/L (ref 55–135)
ALT SERPL W/O P-5'-P-CCNC: 238 U/L (ref 10–44)
ANION GAP SERPL CALC-SCNC: 7 MMOL/L (ref 8–16)
AST SERPL-CCNC: 91 U/L (ref 10–40)
BASOPHILS # BLD AUTO: 0.06 K/UL (ref 0–0.2)
BASOPHILS NFR BLD: 1 % (ref 0–1.9)
BILIRUB SERPL-MCNC: 1.1 MG/DL (ref 0.1–1)
BUN SERPL-MCNC: 17 MG/DL (ref 6–20)
CALCIUM SERPL-MCNC: 8.3 MG/DL (ref 8.7–10.5)
CHLORIDE SERPL-SCNC: 106 MMOL/L (ref 95–110)
CO2 SERPL-SCNC: 27 MMOL/L (ref 23–29)
CREAT SERPL-MCNC: 0.7 MG/DL (ref 0.5–1.4)
DIFFERENTIAL METHOD: ABNORMAL
EOSINOPHIL # BLD AUTO: 0.3 K/UL (ref 0–0.5)
EOSINOPHIL NFR BLD: 5 % (ref 0–8)
ERYTHROCYTE [DISTWIDTH] IN BLOOD BY AUTOMATED COUNT: 13.3 % (ref 11.5–14.5)
EST. GFR  (AFRICAN AMERICAN): >60 ML/MIN/1.73 M^2
EST. GFR  (NON AFRICAN AMERICAN): >60 ML/MIN/1.73 M^2
GLUCOSE SERPL-MCNC: 100 MG/DL (ref 70–110)
HCT VFR BLD AUTO: 42.6 % (ref 40–54)
HGB BLD-MCNC: 14.8 G/DL (ref 14–18)
IMM GRANULOCYTES # BLD AUTO: 0.02 K/UL (ref 0–0.04)
IMM GRANULOCYTES NFR BLD AUTO: 0.3 % (ref 0–0.5)
LYMPHOCYTES # BLD AUTO: 2.1 K/UL (ref 1–4.8)
LYMPHOCYTES NFR BLD: 34.7 % (ref 18–48)
MCH RBC QN AUTO: 33 PG (ref 27–31)
MCHC RBC AUTO-ENTMCNC: 34.7 G/DL (ref 32–36)
MCV RBC AUTO: 95 FL (ref 82–98)
MONOCYTES # BLD AUTO: 0.7 K/UL (ref 0.3–1)
MONOCYTES NFR BLD: 12.3 % (ref 4–15)
NEUTROPHILS # BLD AUTO: 2.8 K/UL (ref 1.8–7.7)
NEUTROPHILS NFR BLD: 46.7 % (ref 38–73)
NRBC BLD-RTO: 0 /100 WBC
PLATELET # BLD AUTO: 219 K/UL (ref 150–450)
PMV BLD AUTO: 10 FL (ref 9.2–12.9)
POTASSIUM SERPL-SCNC: 4 MMOL/L (ref 3.5–5.1)
PROT SERPL-MCNC: 7.6 G/DL (ref 6–8.4)
RBC # BLD AUTO: 4.48 M/UL (ref 4.6–6.2)
SODIUM SERPL-SCNC: 140 MMOL/L (ref 136–145)
WBC # BLD AUTO: 6.02 K/UL (ref 3.9–12.7)

## 2021-06-07 PROCEDURE — 36000710: Performed by: SURGERY

## 2021-06-07 PROCEDURE — 25000003 PHARM REV CODE 250: Performed by: NURSE ANESTHETIST, CERTIFIED REGISTERED

## 2021-06-07 PROCEDURE — 63600175 PHARM REV CODE 636 W HCPCS: Performed by: NURSE ANESTHETIST, CERTIFIED REGISTERED

## 2021-06-07 PROCEDURE — 71000039 HC RECOVERY, EACH ADD'L HOUR: Performed by: SURGERY

## 2021-06-07 PROCEDURE — 36000711: Performed by: SURGERY

## 2021-06-07 PROCEDURE — 37000008 HC ANESTHESIA 1ST 15 MINUTES: Performed by: SURGERY

## 2021-06-07 PROCEDURE — 36415 COLL VENOUS BLD VENIPUNCTURE: CPT | Performed by: SURGERY

## 2021-06-07 PROCEDURE — 88304 TISSUE EXAM BY PATHOLOGIST: CPT | Mod: 26,,, | Performed by: PATHOLOGY

## 2021-06-07 PROCEDURE — 49650 LAP ING HERNIA REPAIR INIT: CPT | Mod: ,,, | Performed by: SURGERY

## 2021-06-07 PROCEDURE — 71000015 HC POSTOP RECOV 1ST HR: Performed by: SURGERY

## 2021-06-07 PROCEDURE — 88304 TISSUE EXAM BY PATHOLOGIST: CPT | Performed by: PATHOLOGY

## 2021-06-07 PROCEDURE — 00840 ANES IPER PX LOWER ABD NOS: CPT | Performed by: SURGERY

## 2021-06-07 PROCEDURE — 85025 COMPLETE CBC W/AUTO DIFF WBC: CPT | Performed by: SURGERY

## 2021-06-07 PROCEDURE — 63600175 PHARM REV CODE 636 W HCPCS: Performed by: NURSE PRACTITIONER

## 2021-06-07 PROCEDURE — 63600175 PHARM REV CODE 636 W HCPCS: Performed by: ANESTHESIOLOGY

## 2021-06-07 PROCEDURE — 25000003 PHARM REV CODE 250: Performed by: SURGERY

## 2021-06-07 PROCEDURE — 71000033 HC RECOVERY, INTIAL HOUR: Performed by: SURGERY

## 2021-06-07 PROCEDURE — 88304 PR  SURG PATH,LEVEL III: ICD-10-PCS | Mod: 26,,, | Performed by: PATHOLOGY

## 2021-06-07 PROCEDURE — C1781 MESH (IMPLANTABLE): HCPCS | Performed by: SURGERY

## 2021-06-07 PROCEDURE — 71000016 HC POSTOP RECOV ADDL HR: Performed by: SURGERY

## 2021-06-07 PROCEDURE — 37000009 HC ANESTHESIA EA ADD 15 MINS: Performed by: SURGERY

## 2021-06-07 PROCEDURE — 80053 COMPREHEN METABOLIC PANEL: CPT | Performed by: SURGERY

## 2021-06-07 PROCEDURE — 49650 PR LAP,INGUINAL HERNIA REPR,INITIAL: ICD-10-PCS | Mod: ,,, | Performed by: SURGERY

## 2021-06-07 DEVICE — MESH LAP PG 10X15 DF FLATSHEET: Type: IMPLANTABLE DEVICE | Site: INGUINAL | Status: FUNCTIONAL

## 2021-06-07 RX ORDER — ROCURONIUM BROMIDE 10 MG/ML
INJECTION, SOLUTION INTRAVENOUS
Status: DISCONTINUED | OUTPATIENT
Start: 2021-06-07 | End: 2021-06-07

## 2021-06-07 RX ORDER — ONDANSETRON 2 MG/ML
4 INJECTION INTRAMUSCULAR; INTRAVENOUS ONCE AS NEEDED
Status: DISCONTINUED | OUTPATIENT
Start: 2021-06-07 | End: 2021-06-07 | Stop reason: HOSPADM

## 2021-06-07 RX ORDER — SODIUM CHLORIDE, SODIUM LACTATE, POTASSIUM CHLORIDE, CALCIUM CHLORIDE 600; 310; 30; 20 MG/100ML; MG/100ML; MG/100ML; MG/100ML
INJECTION, SOLUTION INTRAVENOUS CONTINUOUS
Status: DISCONTINUED | OUTPATIENT
Start: 2021-06-07 | End: 2021-06-07 | Stop reason: HOSPADM

## 2021-06-07 RX ORDER — BUPIVACAINE HYDROCHLORIDE 5 MG/ML
INJECTION, SOLUTION PERINEURAL
Status: DISCONTINUED | OUTPATIENT
Start: 2021-06-07 | End: 2021-06-07 | Stop reason: HOSPADM

## 2021-06-07 RX ORDER — LIDOCAINE HCL/PF 100 MG/5ML
SYRINGE (ML) INTRAVENOUS
Status: DISCONTINUED | OUTPATIENT
Start: 2021-06-07 | End: 2021-06-07

## 2021-06-07 RX ORDER — PROPOFOL 10 MG/ML
VIAL (ML) INTRAVENOUS
Status: DISCONTINUED | OUTPATIENT
Start: 2021-06-07 | End: 2021-06-07

## 2021-06-07 RX ORDER — ONDANSETRON HYDROCHLORIDE 2 MG/ML
INJECTION, SOLUTION INTRAMUSCULAR; INTRAVENOUS
Status: DISCONTINUED | OUTPATIENT
Start: 2021-06-07 | End: 2021-06-07

## 2021-06-07 RX ORDER — OXYCODONE AND ACETAMINOPHEN 5; 325 MG/1; MG/1
1 TABLET ORAL EVERY 6 HOURS PRN
Qty: 12 TABLET | Refills: 0 | Status: SHIPPED | OUTPATIENT
Start: 2021-06-07 | End: 2021-06-14

## 2021-06-07 RX ORDER — OXYCODONE AND ACETAMINOPHEN 5; 325 MG/1; MG/1
1 TABLET ORAL EVERY 4 HOURS PRN
Status: DISCONTINUED | OUTPATIENT
Start: 2021-06-07 | End: 2021-06-07 | Stop reason: HOSPADM

## 2021-06-07 RX ORDER — FENTANYL CITRATE 50 UG/ML
INJECTION, SOLUTION INTRAMUSCULAR; INTRAVENOUS
Status: DISCONTINUED | OUTPATIENT
Start: 2021-06-07 | End: 2021-06-07

## 2021-06-07 RX ORDER — MIDAZOLAM HYDROCHLORIDE 1 MG/ML
INJECTION INTRAMUSCULAR; INTRAVENOUS
Status: DISCONTINUED | OUTPATIENT
Start: 2021-06-07 | End: 2021-06-07

## 2021-06-07 RX ORDER — SODIUM CHLORIDE 9 MG/ML
INJECTION, SOLUTION INTRAVENOUS CONTINUOUS
Status: DISCONTINUED | OUTPATIENT
Start: 2021-06-07 | End: 2021-06-07 | Stop reason: HOSPADM

## 2021-06-07 RX ORDER — LIDOCAINE HYDROCHLORIDE 10 MG/ML
1 INJECTION, SOLUTION EPIDURAL; INFILTRATION; INTRACAUDAL; PERINEURAL ONCE
Status: DISCONTINUED | OUTPATIENT
Start: 2021-06-07 | End: 2021-06-07 | Stop reason: HOSPADM

## 2021-06-07 RX ORDER — DEXAMETHASONE SODIUM PHOSPHATE 4 MG/ML
INJECTION, SOLUTION INTRA-ARTICULAR; INTRALESIONAL; INTRAMUSCULAR; INTRAVENOUS; SOFT TISSUE
Status: DISCONTINUED | OUTPATIENT
Start: 2021-06-07 | End: 2021-06-07

## 2021-06-07 RX ORDER — SODIUM CHLORIDE 0.9 % (FLUSH) 0.9 %
10 SYRINGE (ML) INJECTION
Status: DISCONTINUED | OUTPATIENT
Start: 2021-06-07 | End: 2021-06-07 | Stop reason: HOSPADM

## 2021-06-07 RX ORDER — HYDROMORPHONE HYDROCHLORIDE 2 MG/ML
0.5 INJECTION, SOLUTION INTRAMUSCULAR; INTRAVENOUS; SUBCUTANEOUS EVERY 5 MIN PRN
Status: DISPENSED | OUTPATIENT
Start: 2021-06-07 | End: 2021-06-07

## 2021-06-07 RX ADMIN — FENTANYL CITRATE 100 MCG: 50 INJECTION, SOLUTION INTRAMUSCULAR; INTRAVENOUS at 09:06

## 2021-06-07 RX ADMIN — ROCURONIUM BROMIDE 30 MG: 10 INJECTION, SOLUTION INTRAVENOUS at 09:06

## 2021-06-07 RX ADMIN — HYDROMORPHONE HYDROCHLORIDE 0.5 MG: 2 INJECTION, SOLUTION INTRAMUSCULAR; INTRAVENOUS; SUBCUTANEOUS at 10:06

## 2021-06-07 RX ADMIN — PROPOFOL 150 MG: 10 INJECTION, EMULSION INTRAVENOUS at 09:06

## 2021-06-07 RX ADMIN — SODIUM CHLORIDE 2 G: 9 INJECTION, SOLUTION INTRAVENOUS at 10:06

## 2021-06-07 RX ADMIN — SODIUM CHLORIDE, SODIUM LACTATE, POTASSIUM CHLORIDE, AND CALCIUM CHLORIDE: .6; .31; .03; .02 INJECTION, SOLUTION INTRAVENOUS at 09:06

## 2021-06-07 RX ADMIN — DEXAMETHASONE SODIUM PHOSPHATE 8 MG: 4 INJECTION, SOLUTION INTRA-ARTICULAR; INTRALESIONAL; INTRAMUSCULAR; INTRAVENOUS; SOFT TISSUE at 09:06

## 2021-06-07 RX ADMIN — OXYCODONE HYDROCHLORIDE AND ACETAMINOPHEN 1 TABLET: 5; 325 TABLET ORAL at 10:06

## 2021-06-07 RX ADMIN — HYDROMORPHONE HYDROCHLORIDE 0.5 MG: 2 INJECTION, SOLUTION INTRAMUSCULAR; INTRAVENOUS; SUBCUTANEOUS at 11:06

## 2021-06-07 RX ADMIN — LIDOCAINE HYDROCHLORIDE 80 MG: 20 INJECTION, SOLUTION INTRAVENOUS at 09:06

## 2021-06-07 RX ADMIN — MIDAZOLAM HYDROCHLORIDE 2 MG: 1 INJECTION, SOLUTION INTRAMUSCULAR; INTRAVENOUS at 09:06

## 2021-06-07 RX ADMIN — PROPOFOL 50 MG: 10 INJECTION, EMULSION INTRAVENOUS at 09:06

## 2021-06-07 RX ADMIN — ONDANSETRON 4 MG: 2 INJECTION, SOLUTION INTRAMUSCULAR; INTRAVENOUS at 09:06

## 2021-06-09 LAB
FINAL PATHOLOGIC DIAGNOSIS: NORMAL
GROSS: NORMAL
Lab: NORMAL

## 2021-06-10 ENCOUNTER — TELEPHONE (OUTPATIENT)
Dept: FAMILY MEDICINE | Facility: HOSPITAL | Age: 56
End: 2021-06-10

## 2021-06-17 ENCOUNTER — IMMUNIZATION (OUTPATIENT)
Dept: PHARMACY | Facility: CLINIC | Age: 56
End: 2021-06-17
Payer: MEDICAID

## 2021-06-17 DIAGNOSIS — Z23 NEED FOR VACCINATION: Primary | ICD-10-CM

## 2021-07-06 ENCOUNTER — OFFICE VISIT (OUTPATIENT)
Dept: SURGERY | Facility: CLINIC | Age: 56
End: 2021-07-06
Payer: MEDICAID

## 2021-07-06 VITALS
HEART RATE: 83 BPM | WEIGHT: 154.56 LBS | SYSTOLIC BLOOD PRESSURE: 114 MMHG | HEIGHT: 65 IN | DIASTOLIC BLOOD PRESSURE: 75 MMHG | BODY MASS INDEX: 25.75 KG/M2

## 2021-07-06 DIAGNOSIS — K40.90 RIGHT INGUINAL HERNIA: Primary | ICD-10-CM

## 2021-07-06 PROCEDURE — 99999 PR PBB SHADOW E&M-EST. PATIENT-LVL III: ICD-10-PCS | Mod: PBBFAC,,, | Performed by: SURGERY

## 2021-07-06 PROCEDURE — 99024 PR POST-OP FOLLOW-UP VISIT: ICD-10-PCS | Mod: ,,, | Performed by: SURGERY

## 2021-07-06 PROCEDURE — 99213 OFFICE O/P EST LOW 20 MIN: CPT | Mod: PBBFAC,PO | Performed by: SURGERY

## 2021-07-06 PROCEDURE — 99024 POSTOP FOLLOW-UP VISIT: CPT | Mod: ,,, | Performed by: SURGERY

## 2021-07-06 PROCEDURE — 99999 PR PBB SHADOW E&M-EST. PATIENT-LVL III: CPT | Mod: PBBFAC,,, | Performed by: SURGERY

## 2024-01-29 ENCOUNTER — OFFICE VISIT (OUTPATIENT)
Dept: URGENT CARE | Facility: CLINIC | Age: 59
End: 2024-01-29
Payer: MEDICAID

## 2024-01-29 VITALS
TEMPERATURE: 98 F | HEIGHT: 65 IN | HEART RATE: 78 BPM | SYSTOLIC BLOOD PRESSURE: 150 MMHG | DIASTOLIC BLOOD PRESSURE: 93 MMHG | WEIGHT: 154 LBS | OXYGEN SATURATION: 95 % | RESPIRATION RATE: 19 BRPM | BODY MASS INDEX: 25.66 KG/M2

## 2024-01-29 DIAGNOSIS — K04.7 DENTAL INFECTION: Primary | ICD-10-CM

## 2024-01-29 PROCEDURE — 99204 OFFICE O/P NEW MOD 45 MIN: CPT | Mod: S$GLB,,, | Performed by: NURSE PRACTITIONER

## 2024-01-29 RX ORDER — CLINDAMYCIN HYDROCHLORIDE 150 MG/1
450 CAPSULE ORAL EVERY 8 HOURS
Qty: 63 CAPSULE | Refills: 0 | Status: SHIPPED | OUTPATIENT
Start: 2024-01-29 | End: 2024-02-05

## 2024-01-29 NOTE — PATIENT INSTRUCTIONS
Please schedule follow-up with your general dentist as soon as possible to discuss your ongoing issue of dental fractures and dental caries.

## 2024-08-20 ENCOUNTER — HOSPITAL ENCOUNTER (INPATIENT)
Facility: HOSPITAL | Age: 59
LOS: 3 days | Discharge: HOME | DRG: 897 | End: 2024-08-23
Attending: EMERGENCY MEDICINE | Admitting: HOSPITALIST
Payer: MEDICAID

## 2024-08-20 DIAGNOSIS — F10.939 ALCOHOL WITHDRAWAL SYNDROME WITH COMPLICATION: Primary | ICD-10-CM

## 2024-08-20 DIAGNOSIS — R45.1 AGITATION: ICD-10-CM

## 2024-08-20 DIAGNOSIS — R05.9 COUGH: ICD-10-CM

## 2024-08-20 DIAGNOSIS — F10.10 ALCOHOL ABUSE: ICD-10-CM

## 2024-08-20 DIAGNOSIS — R00.0 TACHYCARDIA: ICD-10-CM

## 2024-08-20 PROBLEM — F99 PSYCHIATRIC DISTURBANCE: Status: ACTIVE | Noted: 2024-08-20

## 2024-08-20 LAB
ALBUMIN SERPL BCP-MCNC: 3.7 G/DL (ref 3.5–5.2)
ALP SERPL-CCNC: 56 U/L (ref 55–135)
ALT SERPL W/O P-5'-P-CCNC: 116 U/L (ref 10–44)
AMPHET+METHAMPHET UR QL: NEGATIVE
ANION GAP SERPL CALC-SCNC: 10 MMOL/L (ref 8–16)
APAP SERPL-MCNC: <3 UG/ML (ref 10–20)
AST SERPL-CCNC: 112 U/L (ref 10–40)
BACTERIA #/AREA URNS AUTO: NORMAL /HPF
BARBITURATES UR QL SCN>200 NG/ML: NEGATIVE
BASOPHILS # BLD AUTO: 0.07 K/UL (ref 0–0.2)
BASOPHILS NFR BLD: 1.4 % (ref 0–1.9)
BENZODIAZ UR QL SCN>200 NG/ML: NEGATIVE
BILIRUB SERPL-MCNC: 0.7 MG/DL (ref 0.1–1)
BILIRUB UR QL STRIP: NEGATIVE
BUN SERPL-MCNC: 5 MG/DL (ref 6–20)
BZE UR QL SCN: NEGATIVE
CALCIUM SERPL-MCNC: 8.7 MG/DL (ref 8.7–10.5)
CANNABINOIDS UR QL SCN: NEGATIVE
CHLORIDE SERPL-SCNC: 104 MMOL/L (ref 95–110)
CLARITY UR REFRACT.AUTO: CLEAR
CO2 SERPL-SCNC: 24 MMOL/L (ref 23–29)
COLOR UR AUTO: YELLOW
CREAT SERPL-MCNC: 0.7 MG/DL (ref 0.5–1.4)
CREAT UR-MCNC: 33 MG/DL (ref 23–375)
DIFFERENTIAL METHOD BLD: ABNORMAL
EOSINOPHIL # BLD AUTO: 0.1 K/UL (ref 0–0.5)
EOSINOPHIL NFR BLD: 2.7 % (ref 0–8)
ERYTHROCYTE [DISTWIDTH] IN BLOOD BY AUTOMATED COUNT: 12.5 % (ref 11.5–14.5)
EST. GFR  (NO RACE VARIABLE): >60 ML/MIN/1.73 M^2
ETHANOL SERPL-MCNC: 176 MG/DL
ETHANOL UR-MCNC: 403 MG/DL
GLUCOSE SERPL-MCNC: 135 MG/DL (ref 70–110)
GLUCOSE UR QL STRIP: NEGATIVE
HCT VFR BLD AUTO: 47.5 % (ref 40–54)
HGB BLD-MCNC: 16.5 G/DL (ref 14–18)
HGB UR QL STRIP: ABNORMAL
HYALINE CASTS UR QL AUTO: 0 /LPF
IMM GRANULOCYTES # BLD AUTO: 0.05 K/UL (ref 0–0.04)
IMM GRANULOCYTES NFR BLD AUTO: 1 % (ref 0–0.5)
KETONES UR QL STRIP: NEGATIVE
LEUKOCYTE ESTERASE UR QL STRIP: NEGATIVE
LYMPHOCYTES # BLD AUTO: 2.2 K/UL (ref 1–4.8)
LYMPHOCYTES NFR BLD: 43.5 % (ref 18–48)
MCH RBC QN AUTO: 34.1 PG (ref 27–31)
MCHC RBC AUTO-ENTMCNC: 34.7 G/DL (ref 32–36)
MCV RBC AUTO: 98 FL (ref 82–98)
METHADONE UR QL SCN>300 NG/ML: NEGATIVE
MICROSCOPIC COMMENT: NORMAL
MONOCYTES # BLD AUTO: 0.6 K/UL (ref 0.3–1)
MONOCYTES NFR BLD: 12 % (ref 4–15)
NEUTROPHILS # BLD AUTO: 2 K/UL (ref 1.8–7.7)
NEUTROPHILS NFR BLD: 39.4 % (ref 38–73)
NITRITE UR QL STRIP: NEGATIVE
NRBC BLD-RTO: 0 /100 WBC
OPIATES UR QL SCN: NEGATIVE
PCP UR QL SCN>25 NG/ML: NEGATIVE
PH UR STRIP: 6 [PH] (ref 5–8)
PLATELET # BLD AUTO: 134 K/UL (ref 150–450)
PMV BLD AUTO: 10.5 FL (ref 9.2–12.9)
POTASSIUM SERPL-SCNC: 3.3 MMOL/L (ref 3.5–5.1)
PROT SERPL-MCNC: 7.5 G/DL (ref 6–8.4)
PROT UR QL STRIP: ABNORMAL
RBC # BLD AUTO: 4.84 M/UL (ref 4.6–6.2)
RBC #/AREA URNS AUTO: 1 /HPF (ref 0–4)
SALICYLATES SERPL-MCNC: <5 MG/DL (ref 15–30)
SODIUM SERPL-SCNC: 138 MMOL/L (ref 136–145)
SP GR UR STRIP: 1 (ref 1–1.03)
SQUAMOUS #/AREA URNS AUTO: 0 /HPF
TOXICOLOGY INFORMATION: ABNORMAL
URN SPEC COLLECT METH UR: ABNORMAL
WBC # BLD AUTO: 5.1 K/UL (ref 3.9–12.7)
WBC #/AREA URNS AUTO: 1 /HPF (ref 0–5)

## 2024-08-20 PROCEDURE — 80307 DRUG TEST PRSMV CHEM ANLYZR: CPT

## 2024-08-20 PROCEDURE — 80143 DRUG ASSAY ACETAMINOPHEN: CPT

## 2024-08-20 PROCEDURE — 85025 COMPLETE CBC W/AUTO DIFF WBC: CPT

## 2024-08-20 PROCEDURE — 93010 ELECTROCARDIOGRAM REPORT: CPT | Mod: ,,, | Performed by: INTERNAL MEDICINE

## 2024-08-20 PROCEDURE — 63600175 PHARM REV CODE 636 W HCPCS: Performed by: HOSPITALIST

## 2024-08-20 PROCEDURE — 93005 ELECTROCARDIOGRAM TRACING: CPT

## 2024-08-20 PROCEDURE — 96374 THER/PROPH/DIAG INJ IV PUSH: CPT

## 2024-08-20 PROCEDURE — 99285 EMERGENCY DEPT VISIT HI MDM: CPT | Mod: 25

## 2024-08-20 PROCEDURE — S4991 NICOTINE PATCH NONLEGEND: HCPCS

## 2024-08-20 PROCEDURE — 80179 DRUG ASSAY SALICYLATE: CPT

## 2024-08-20 PROCEDURE — 81001 URINALYSIS AUTO W/SCOPE: CPT

## 2024-08-20 PROCEDURE — 25000003 PHARM REV CODE 250: Performed by: HOSPITALIST

## 2024-08-20 PROCEDURE — 96375 TX/PRO/DX INJ NEW DRUG ADDON: CPT

## 2024-08-20 PROCEDURE — 25000003 PHARM REV CODE 250

## 2024-08-20 PROCEDURE — 82077 ASSAY SPEC XCP UR&BREATH IA: CPT | Performed by: EMERGENCY MEDICINE

## 2024-08-20 PROCEDURE — 63600175 PHARM REV CODE 636 W HCPCS

## 2024-08-20 PROCEDURE — 80053 COMPREHEN METABOLIC PANEL: CPT

## 2024-08-20 PROCEDURE — 96376 TX/PRO/DX INJ SAME DRUG ADON: CPT

## 2024-08-20 PROCEDURE — 12000002 HC ACUTE/MED SURGE SEMI-PRIVATE ROOM

## 2024-08-20 RX ORDER — THIAMINE HCL 100 MG
100 TABLET ORAL DAILY
Status: DISCONTINUED | OUTPATIENT
Start: 2024-08-21 | End: 2024-08-23 | Stop reason: HOSPADM

## 2024-08-20 RX ORDER — NALOXONE HCL 0.4 MG/ML
0.02 VIAL (ML) INJECTION
Status: DISCONTINUED | OUTPATIENT
Start: 2024-08-20 | End: 2024-08-23 | Stop reason: HOSPADM

## 2024-08-20 RX ORDER — DIAZEPAM 5 MG/1
5 TABLET ORAL
Status: DISCONTINUED | OUTPATIENT
Start: 2024-08-22 | End: 2024-08-23

## 2024-08-20 RX ORDER — DIAZEPAM 5 MG/1
10 TABLET ORAL EVERY 6 HOURS
Status: COMPLETED | OUTPATIENT
Start: 2024-08-20 | End: 2024-08-21

## 2024-08-20 RX ORDER — IBUPROFEN 200 MG
16 TABLET ORAL
Status: DISCONTINUED | OUTPATIENT
Start: 2024-08-20 | End: 2024-08-23 | Stop reason: HOSPADM

## 2024-08-20 RX ORDER — PROPRANOLOL HYDROCHLORIDE 10 MG/1
10 TABLET ORAL 2 TIMES DAILY
Status: DISCONTINUED | OUTPATIENT
Start: 2024-08-20 | End: 2024-08-23 | Stop reason: HOSPADM

## 2024-08-20 RX ORDER — IBUPROFEN 200 MG
24 TABLET ORAL
Status: DISCONTINUED | OUTPATIENT
Start: 2024-08-20 | End: 2024-08-23 | Stop reason: HOSPADM

## 2024-08-20 RX ORDER — ONDANSETRON HYDROCHLORIDE 2 MG/ML
4 INJECTION, SOLUTION INTRAVENOUS EVERY 8 HOURS PRN
Status: DISCONTINUED | OUTPATIENT
Start: 2024-08-20 | End: 2024-08-23 | Stop reason: HOSPADM

## 2024-08-20 RX ORDER — IBUPROFEN 200 MG
1 TABLET ORAL DAILY
Status: DISCONTINUED | OUTPATIENT
Start: 2024-08-20 | End: 2024-08-23 | Stop reason: HOSPADM

## 2024-08-20 RX ORDER — POTASSIUM CHLORIDE 20 MEQ/1
40 TABLET, EXTENDED RELEASE ORAL ONCE
Status: COMPLETED | OUTPATIENT
Start: 2024-08-20 | End: 2024-08-20

## 2024-08-20 RX ORDER — OLANZAPINE 5 MG/1
10 TABLET, ORALLY DISINTEGRATING ORAL NIGHTLY
Status: DISCONTINUED | OUTPATIENT
Start: 2024-08-20 | End: 2024-08-23 | Stop reason: HOSPADM

## 2024-08-20 RX ORDER — LORAZEPAM 2 MG/ML
2 INJECTION INTRAMUSCULAR
Status: COMPLETED | OUTPATIENT
Start: 2024-08-20 | End: 2024-08-20

## 2024-08-20 RX ORDER — DIAZEPAM 5 MG/1
5 TABLET ORAL
Status: DISCONTINUED | OUTPATIENT
Start: 2024-08-23 | End: 2024-08-23

## 2024-08-20 RX ORDER — LORAZEPAM 2 MG/ML
4 INJECTION INTRAMUSCULAR
Status: COMPLETED | OUTPATIENT
Start: 2024-08-20 | End: 2024-08-20

## 2024-08-20 RX ORDER — OLANZAPINE 5 MG/1
TABLET, ORALLY DISINTEGRATING ORAL
Status: DISPENSED
Start: 2024-08-20 | End: 2024-08-21

## 2024-08-20 RX ORDER — DIAZEPAM 5 MG/1
10 TABLET ORAL
Status: COMPLETED | OUTPATIENT
Start: 2024-08-21 | End: 2024-08-22

## 2024-08-20 RX ORDER — DIVALPROEX SODIUM 250 MG/1
250 TABLET, DELAYED RELEASE ORAL NIGHTLY
Status: DISCONTINUED | OUTPATIENT
Start: 2024-08-20 | End: 2024-08-23 | Stop reason: HOSPADM

## 2024-08-20 RX ORDER — TALC
6 POWDER (GRAM) TOPICAL NIGHTLY PRN
Status: DISCONTINUED | OUTPATIENT
Start: 2024-08-20 | End: 2024-08-23 | Stop reason: HOSPADM

## 2024-08-20 RX ORDER — LORAZEPAM 1 MG/1
2 TABLET ORAL EVERY 4 HOURS PRN
Status: DISCONTINUED | OUTPATIENT
Start: 2024-08-20 | End: 2024-08-23 | Stop reason: HOSPADM

## 2024-08-20 RX ORDER — SODIUM CHLORIDE 0.9 % (FLUSH) 0.9 %
10 SYRINGE (ML) INJECTION
Status: DISCONTINUED | OUTPATIENT
Start: 2024-08-20 | End: 2024-08-23 | Stop reason: HOSPADM

## 2024-08-20 RX ORDER — ENOXAPARIN SODIUM 100 MG/ML
40 INJECTION SUBCUTANEOUS EVERY 24 HOURS
Status: DISCONTINUED | OUTPATIENT
Start: 2024-08-21 | End: 2024-08-20

## 2024-08-20 RX ORDER — DIAZEPAM 10 MG/2ML
5 INJECTION INTRAMUSCULAR
Status: COMPLETED | OUTPATIENT
Start: 2024-08-20 | End: 2024-08-20

## 2024-08-20 RX ORDER — ACETAMINOPHEN 325 MG/1
650 TABLET ORAL EVERY 6 HOURS PRN
Status: DISCONTINUED | OUTPATIENT
Start: 2024-08-20 | End: 2024-08-23 | Stop reason: HOSPADM

## 2024-08-20 RX ORDER — CLONIDINE HYDROCHLORIDE 0.1 MG/1
0.1 TABLET ORAL EVERY 4 HOURS PRN
Status: DISCONTINUED | OUTPATIENT
Start: 2024-08-20 | End: 2024-08-23 | Stop reason: HOSPADM

## 2024-08-20 RX ORDER — PROCHLORPERAZINE EDISYLATE 5 MG/ML
5 INJECTION INTRAMUSCULAR; INTRAVENOUS EVERY 6 HOURS PRN
Status: DISCONTINUED | OUTPATIENT
Start: 2024-08-20 | End: 2024-08-23 | Stop reason: HOSPADM

## 2024-08-20 RX ORDER — GLUCAGON 1 MG
1 KIT INJECTION
Status: DISCONTINUED | OUTPATIENT
Start: 2024-08-20 | End: 2024-08-23 | Stop reason: HOSPADM

## 2024-08-20 RX ORDER — FOLIC ACID 1 MG/1
1 TABLET ORAL DAILY
Status: DISCONTINUED | OUTPATIENT
Start: 2024-08-21 | End: 2024-08-23 | Stop reason: HOSPADM

## 2024-08-20 RX ORDER — ENOXAPARIN SODIUM 100 MG/ML
40 INJECTION SUBCUTANEOUS EVERY 24 HOURS
Status: DISCONTINUED | OUTPATIENT
Start: 2024-08-21 | End: 2024-08-23 | Stop reason: HOSPADM

## 2024-08-20 RX ORDER — PROPRANOLOL HYDROCHLORIDE 10 MG/1
10 TABLET ORAL 2 TIMES DAILY PRN
COMMUNITY
Start: 2024-06-13

## 2024-08-20 RX ADMIN — DIVALPROEX SODIUM 250 MG: 250 TABLET, DELAYED RELEASE ORAL at 11:08

## 2024-08-20 RX ADMIN — FOLIC ACID: 5 INJECTION, SOLUTION INTRAMUSCULAR; INTRAVENOUS; SUBCUTANEOUS at 06:08

## 2024-08-20 RX ADMIN — NICOTINE 1 PATCH: 14 PATCH, EXTENDED RELEASE TRANSDERMAL at 11:08

## 2024-08-20 RX ADMIN — POTASSIUM CHLORIDE 40 MEQ: 1500 TABLET, EXTENDED RELEASE ORAL at 07:08

## 2024-08-20 RX ADMIN — DIAZEPAM 10 MG: 5 TABLET ORAL at 06:08

## 2024-08-20 RX ADMIN — LORAZEPAM 4 MG: 2 INJECTION INTRAMUSCULAR; INTRAVENOUS at 03:08

## 2024-08-20 RX ADMIN — LORAZEPAM 2 MG: 2 INJECTION INTRAMUSCULAR; INTRAVENOUS at 11:08

## 2024-08-20 RX ADMIN — OLANZAPINE 10 MG: 5 TABLET, ORALLY DISINTEGRATING ORAL at 11:08

## 2024-08-20 RX ADMIN — PROPRANOLOL HYDROCHLORIDE 10 MG: 10 TABLET ORAL at 11:08

## 2024-08-20 RX ADMIN — DIAZEPAM 10 MG: 5 TABLET ORAL at 11:08

## 2024-08-20 RX ADMIN — DIAZEPAM 5 MG: 5 INJECTION INTRAMUSCULAR; INTRAVENOUS at 01:08

## 2024-08-20 NOTE — ED PROVIDER NOTES
CONCERNS:Has a runny nose and cough fo the last 3-4 days, using childrens tylenol to help and and vicks on his chest.   Child accompanied by father  Mothers work status: Dad full time  Child with:  during the day  DIET:      Appetite - picky      Milk - 2 Percent and 1 Percent      Frequency - 8 ounces / day  STOOLS: normal  IMMUNIZATION REACTIONS: NO  VARICELLA STATUS: unknown  CHOLESTEROL SCREENING:      Parent with cholesterol >240mg/dl: NO      Parent or grandparent with CAD or PVD: Has heart issues in the family.  GROWTH & DEVELOPMENT       Spoon spilling - YES       Scribbles - YES       Points to body parts - YES       Kicks, jumps, runs, throws - YES       Combines 2 words - YES    Patient's  would like communication of their results via:      Cell Phone:   Telephone Information:   Mobile [phone]     Okay to leave a message containing results? Yes       Encounter Date: 8/20/2024       History     Chief Complaint   Patient presents with    Mental Health Problem     Arrives with TINO with PEC for gravely disabled. ETOH abuse and in withdrawals hx of schizophrenia      Patient is a 59-year-old male with past medical history of alcohol withdrawal this presenting from an outpatient psychiatric facility via police with a PEC in place.  Pec was placed because the patient had poor insight and is gravely disabled.  This time patient is denying wanting to hurt himself or others.  Patient is very anxious and angry towards staff.  Patient has not had a drink in 2 days.  His baseline is stated as greater than 24 alcoholic beverages per week.  Patient takes diazepam at home to prevent withdrawals.  Patient does not have a history of delirium tremors or seizures.        Review of patient's allergies indicates:   Allergen Reactions    Penicillins Rash     Past Medical History:   Diagnosis Date    Alcohol abuse     Hepatitis C      Past Surgical History:   Procedure Laterality Date    OTHER SURGICAL HISTORY      right arm surgery    ROBOT-ASSISTED LAPAROSCOPIC REPAIR OF INGUINAL HERNIA Right 6/7/2021    Procedure: ROBOTIC REPAIR, HERNIA, INGUINAL (right possible left);  Surgeon: Los Perry MD;  Location: [facility] OR;  Service: General;  Laterality: Right;    TONSILLECTOMY       No family history on file.  Social History     Tobacco Use    Smoking status: Every Day     Current packs/day: 1.00     Types: Cigarettes    Smokeless tobacco: Never   Substance Use Topics    Alcohol use: Yes     Alcohol/week: 24.0 standard drinks of alcohol     Types: 24 Cans of beer per week     Comment: whiskey daily    Drug use: No     Comment: former pill abuser; denies IVDA     Review of Systems    Physical Exam     Initial Vitals   BP Pulse Resp Temp SpO2   08/20/24 1034 08/20/24 1034 08/20/24 1034 08/20/24 1037 08/20/24 1034   (!) 140/93 (!) 115 18 98.2 °F (36.8 °C) 97 %      MAP       --                 Physical Exam    Nursing note and vitals reviewed.  Constitutional: He appears well-developed and well-nourished.   HENT:   Head: Normocephalic and atraumatic.   Eyes: EOM are normal. No scleral icterus.   Neck: Neck supple. No thyromegaly present. No tracheal deviation present. No JVD present.   Normal range of motion.  Cardiovascular:  Normal rate, regular rhythm, normal heart sounds and intact distal pulses.     Exam reveals no gallop and no friction rub.       No murmur heard.  Pulmonary/Chest: Breath sounds normal. No stridor. No respiratory distress. He has no wheezes. He has no rales. He exhibits no tenderness.   Abdominal: Abdomen is soft. He exhibits no distension. There is no abdominal tenderness. There is no rebound.   Musculoskeletal:      Cervical back: Normal range of motion and neck supple.     Neurological: He is alert and oriented to person, place, and time.   Facial fasciculations noted, tongue fasciculations and tremors of hands noted   Skin: Skin is warm.   Psychiatric: He has a normal mood and affect. His behavior is normal. Judgment and thought content normal.         ED Course   Procedures  Labs Reviewed   CBC W/ AUTO DIFFERENTIAL - Abnormal       Result Value    WBC 5.10      RBC 4.84      Hemoglobin 16.5      Hematocrit 47.5      MCV 98      MCH 34.1 (*)     MCHC 34.7      RDW 12.5      Platelets 134 (*)     MPV 10.5      Immature Granulocytes 1.0 (*)     Gran # (ANC) 2.0      Immature Grans (Abs) 0.05 (*)     Lymph # 2.2      Mono # 0.6      Eos # 0.1      Baso # 0.07      nRBC 0      Gran % 39.4      Lymph % 43.5      Mono % 12.0      Eosinophil % 2.7      Basophil % 1.4      Differential Method Automated     COMPREHENSIVE METABOLIC PANEL - Abnormal    Sodium 138      Potassium 3.3 (*)     Chloride 104      CO2 24      Glucose 135 (*)     BUN 5 (*)     Creatinine 0.7      Calcium 8.7      Total Protein 7.5      Albumin 3.7      Total Bilirubin 0.7      Alkaline Phosphatase 56      AST  112 (*)      (*)     eGFR >60.0      Anion Gap 10     ACETAMINOPHEN LEVEL - Abnormal    Acetaminophen (Tylenol), Serum <3.0 (*)    SALICYLATE LEVEL - Abnormal    Salicylate Lvl <5.0 (*)    TOXICOLOGY SCREEN, URINE, RANDOM (COMPLIANCE) - Abnormal    Alcohol, Urine 403 (*)     Benzodiazepines Negative      Methadone metabolites Negative      Cocaine (Metab.) Negative      Opiate Scrn, Ur Negative      Barbiturate Screen, Ur Negative      Amphetamine Screen, Ur Negative      THC Negative      Phencyclidine Negative      Creatinine, Urine 33.0      Toxicology Information SEE COMMENT      Narrative:     Specimen Source->Urine   URINALYSIS, REFLEX TO URINE CULTURE - Abnormal    Specimen UA Urine, Clean Catch      Color, UA Yellow      Appearance, UA Clear      pH, UA 6.0      Specific Gravity, UA 1.005      Protein, UA 2+ (*)     Glucose, UA Negative      Ketones, UA Negative      Bilirubin (UA) Negative      Occult Blood UA Trace (*)     Nitrite, UA Negative      Leukocytes, UA Negative      Narrative:     Specimen Source->Urine   ALCOHOL,MEDICAL (ETHANOL) - Abnormal    Alcohol, Serum 176 (*)    URINALYSIS MICROSCOPIC    RBC, UA 1      WBC, UA 1      Bacteria Rare      Squam Epithel, UA 0      Hyaline Casts, UA 0      Microscopic Comment SEE COMMENT      Narrative:     Specimen Source->Urine   ALCOHOL,MEDICAL (ETHANOL)          Imaging Results    None          Medications   nicotine 14 mg/24 hr 1 patch (1 patch Transdermal Patch Applied 8/20/24 1157)   LORazepam injection 2 mg (2 mg Intravenous Given 8/20/24 1130)   diazePAM injection 5 mg (5 mg Intravenous Given 8/20/24 1336)   LORazepam injection 4 mg (4 mg Intravenous Given 8/20/24 1532)     Medical Decision Making  Differential diagnosis for this patient includes alcohol withdrawal, delirium tremens, SI/HI, substance ingestion, Tylenol ingestion, alcohol intoxication.  SI/HI unlikely in the setting of patient denial and no history of  either.    Life-threatening diagnosis he has considered in this patient include delirium tremens and alcohol withdrawal.    Amount and/or Complexity of Data Reviewed  Independent Historian: EMS     Details: Shared who is at bedside states the patient is here because he is unable to care for himself.  External Data Reviewed: notes.     Details: Pec note from facility reviewed notable for patient with poor insight into his underlying condition as well as inability to take care of himself.    Family medicine note from 11/05/2014 reviewed at that time patient had stopped drinking and needed a note to return to work.    Labs: ordered.     Details: Labs notable for mild hypokalemia which we will not intervene on given the fact that the patient does not have EKG changes.  Mildly elevated elevated glucose patient should follow this up in clinic.  AST ALT are both elevated however they appear to be at the patient's baseline and do not represent a choledocholithiasis but said likely represent elevation due to presumed underlying alcoholic cirrhosis.  Patient has negative salicylate and Tylenol levels making patient's initial agitation due to salicylate or Tylenol toxicity unlikely.  On presentation patient's alcohol was 403, at that time patient had CIWA score in excess of 30 making this patient extremely high risk for seizure.  After 1 mg of IV Ativan and 5 mg IV diazepam, patient's formal CIWA score was 31.  At that time we decided give the patient an additional 4 mg of IV Ativan.  Radiology:      Details: We considered radiology studies however given the patient's lack of chief complaint, we do not believe that head imaging was warranted in the setting of no reported trauma in a patient that was transferred from the facility for a very high alcohol level.  There have been no reported falls.    Risk  OTC drugs.  Prescription drug management.  Parenteral controlled substances.  Drug therapy requiring intensive monitoring for  toxicity.  Decision regarding hospitalization.  Diagnosis or treatment significantly limited by social determinants of health.  Risk Details: This patient required multiple doses of IV Ativan and diazepam in order to control his alcohol withdrawals.  .  Patient decision for hospitalization into the intensive care unit setting for repeat doses of IV Ativan makes this patient high-risk.                     ED Course as of 08/20/24 1713   Tue Aug 20, 2024   1252 Patient reassessed . [KW]   1415 Patient comfortable and reassessed.  Plan to repeat ethanol study at 9:00 p.m. to facilitate psychiatric placement. [KW]   1420 Patient reassessed, 1 hour after an additional 5 mg of diazepam, patient is still experiencing facial tremors, anxiety, and tongue fasciculations.  4 mg Ativan ordered and administered. [KW]      ED Course User Index  [KW] Alberto Reeves MD                           Clinical Impression:  Final diagnoses:  [F10.939] Alcohol withdrawal syndrome with complication (Primary)  [R45.1] Agitation  [F10.10] Alcohol abuse          ED Disposition Condition    Admit                 Alberto Reeves MD  Resident  08/20/24 6232

## 2024-08-20 NOTE — ASSESSMENT & PLAN NOTE
-Pt. PEC'd at outpatient psych clinic for grave disability. Per chart review, pt. On medications depakote and zyprexa, will continue for now  -Psych consulted for assistance while admitted

## 2024-08-20 NOTE — ED NOTES
Pt belongings: shirt, pants, shoes. All secured in the closet.  Security envelope with cell phone in the OC safe.

## 2024-08-20 NOTE — ED TRIAGE NOTES
Pt was sent from the mental health clinic intoxicated Pt states his last drink was this morning when he had Srikanth Sarabia

## 2024-08-20 NOTE — ASSESSMENT & PLAN NOTE
-Pt. Reports cutting back significantly last 2 days, arrived agitated, confused, tremulous, CIWA >8  -Ethanol level 176 when patient displayed WD symptoms, making him higher risk  -Monitor on telemetry with scheduled valium taper starting at 10 mg Q6hrs. Ativan PRN ordered for CIWA >8

## 2024-08-20 NOTE — ED NOTES
Pt is AA&O time Pt asked to drink water  ,swallowed small amount and starting coughing  Pt states he swallowed wrong  Dr Ellis notifired

## 2024-08-20 NOTE — ED NOTES
Pt identifiers Armando Green were checked and are correct  LOC: The patient is awake, alert, aware of environment with an appropriate affect. Oriented x4, speaking appropriately  APPEARANCE: Pt resting comfortably, in no acute distress, pt is clean and well groomed, clothing properly fastened  SKIN: Skin warm, dry and intact, normal skin turgor, moist mucus membranes  RESPIRATORY: Airway is open and patent, respirations are spontaneous, even and unlabored, normal effort and rate  Crackles auscultated to lower lung fields   CARDIAC: Normal rate and rhythm, no peripheral edema noted, capillary refill < 3 seconds, bilateral radial pulses 2+  ABDOMEN: Soft, nontender, nondistended. Bowel sounds present   NEUROLOGIC: PERRL, facial expression is symmetrical, patient moving all extremities spontaneously, normal sensation in all extremities when touched with a finger.  Follows all commands appropriately  MUSCULOSKELETAL: No obvious deformities.

## 2024-08-20 NOTE — HPI
58 yo M with PMHx alcohol abuse who presents to the ED with police from [facility] for alcohol withdrawal concerns and delirium. Pt. Reports was taken to psych appointment by son today, but the staff at the clinic noted pt. Delirious, sweating, tremulous. He reportedly started becoming aggressive and was unable to be reasoned with. Provider recommended he be treated for alcohol withdrawal after obtaining collateral from son. He was placed under PEC and police were called and brought patient to the ED. At time of my assessment, pt. Angry about being in the ED, but he denies any SI or HI. He does not display psychotic behavior and appears alert and oriented x 4, but sleepy after being given IV ativan.    Per notes from [facility], [place] today: Since his last visit, he has been tying to detox by reducing his alcohol intake.he wakes up daily with tremors and shakes, has been vomiting every morning, very anxious, has to drink as soon as he gets up. He has been missing days of workUsual estimated consumption estimated to be just over 1/5 daily. He has been drinking for many years, has never been to rehab. He has never had hospitalization from withdrawals, no reported seizures.    Psych hx, previously hospitalized in for 10 days at [place] in early 2016 for paranoid delusions, was put on Zyprexa and Depakote. He has been on Zyprexa and Depakote since then.

## 2024-08-21 PROBLEM — R74.01 TRANSAMINITIS: Status: ACTIVE | Noted: 2024-08-21

## 2024-08-21 LAB
ALBUMIN SERPL BCP-MCNC: 3 G/DL (ref 3.5–5.2)
ALP SERPL-CCNC: 44 U/L (ref 55–135)
ALT SERPL W/O P-5'-P-CCNC: 85 U/L (ref 10–44)
ANION GAP SERPL CALC-SCNC: 9 MMOL/L (ref 8–16)
AST SERPL-CCNC: 83 U/L (ref 10–40)
BASOPHILS # BLD AUTO: 0.06 K/UL (ref 0–0.2)
BASOPHILS NFR BLD: 1.4 % (ref 0–1.9)
BILIRUB SERPL-MCNC: 1.4 MG/DL (ref 0.1–1)
BUN SERPL-MCNC: 8 MG/DL (ref 6–20)
CALCIUM SERPL-MCNC: 8.1 MG/DL (ref 8.7–10.5)
CHLORIDE SERPL-SCNC: 108 MMOL/L (ref 95–110)
CO2 SERPL-SCNC: 21 MMOL/L (ref 23–29)
CREAT SERPL-MCNC: 0.7 MG/DL (ref 0.5–1.4)
DIFFERENTIAL METHOD BLD: ABNORMAL
EOSINOPHIL # BLD AUTO: 0.1 K/UL (ref 0–0.5)
EOSINOPHIL NFR BLD: 2.4 % (ref 0–8)
ERYTHROCYTE [DISTWIDTH] IN BLOOD BY AUTOMATED COUNT: 12.8 % (ref 11.5–14.5)
EST. GFR  (NO RACE VARIABLE): >60 ML/MIN/1.73 M^2
ETHANOL UR-MCNC: <10 MG/DL
GLUCOSE SERPL-MCNC: 86 MG/DL (ref 70–110)
HCT VFR BLD AUTO: 39.1 % (ref 40–54)
HGB BLD-MCNC: 13.3 G/DL (ref 14–18)
IMM GRANULOCYTES # BLD AUTO: 0.07 K/UL (ref 0–0.04)
IMM GRANULOCYTES NFR BLD AUTO: 1.7 % (ref 0–0.5)
INR PPP: 1 (ref 0.8–1.2)
LYMPHOCYTES # BLD AUTO: 1.8 K/UL (ref 1–4.8)
LYMPHOCYTES NFR BLD: 42.9 % (ref 18–48)
MCH RBC QN AUTO: 35.1 PG (ref 27–31)
MCHC RBC AUTO-ENTMCNC: 34 G/DL (ref 32–36)
MCV RBC AUTO: 103 FL (ref 82–98)
MONOCYTES # BLD AUTO: 0.4 K/UL (ref 0.3–1)
MONOCYTES NFR BLD: 8.5 % (ref 4–15)
NEUTROPHILS # BLD AUTO: 1.8 K/UL (ref 1.8–7.7)
NEUTROPHILS NFR BLD: 43.1 % (ref 38–73)
NRBC BLD-RTO: 0 /100 WBC
OHS QRS DURATION: 82 MS
OHS QTC CALCULATION: 424 MS
PLATELET # BLD AUTO: 87 K/UL (ref 150–450)
PMV BLD AUTO: 10.5 FL (ref 9.2–12.9)
POTASSIUM SERPL-SCNC: 3.8 MMOL/L (ref 3.5–5.1)
PROT SERPL-MCNC: 6.2 G/DL (ref 6–8.4)
PROTHROMBIN TIME: 11.3 SEC (ref 9–12.5)
RBC # BLD AUTO: 3.79 M/UL (ref 4.6–6.2)
SODIUM SERPL-SCNC: 138 MMOL/L (ref 136–145)
WBC # BLD AUTO: 4.24 K/UL (ref 3.9–12.7)

## 2024-08-21 PROCEDURE — 85025 COMPLETE CBC W/AUTO DIFF WBC: CPT | Performed by: HOSPITALIST

## 2024-08-21 PROCEDURE — S4991 NICOTINE PATCH NONLEGEND: HCPCS

## 2024-08-21 PROCEDURE — 20600001 HC STEP DOWN PRIVATE ROOM

## 2024-08-21 PROCEDURE — 25000003 PHARM REV CODE 250: Performed by: HOSPITALIST

## 2024-08-21 PROCEDURE — 85610 PROTHROMBIN TIME: CPT | Performed by: HOSPITALIST

## 2024-08-21 PROCEDURE — 80307 DRUG TEST PRSMV CHEM ANLYZR: CPT | Performed by: INTERNAL MEDICINE

## 2024-08-21 PROCEDURE — 80053 COMPREHEN METABOLIC PANEL: CPT | Performed by: HOSPITALIST

## 2024-08-21 PROCEDURE — 25000003 PHARM REV CODE 250

## 2024-08-21 PROCEDURE — 63600175 PHARM REV CODE 636 W HCPCS: Performed by: HOSPITALIST

## 2024-08-21 PROCEDURE — 90792 PSYCH DIAG EVAL W/MED SRVCS: CPT | Mod: AF,HB,, | Performed by: PSYCHIATRY & NEUROLOGY

## 2024-08-21 RX ADMIN — FOLIC ACID 1 MG: 1 TABLET ORAL at 08:08

## 2024-08-21 RX ADMIN — DIAZEPAM 10 MG: 5 TABLET ORAL at 05:08

## 2024-08-21 RX ADMIN — THERA TABS 1 TABLET: TAB at 08:08

## 2024-08-21 RX ADMIN — NICOTINE 1 PATCH: 14 PATCH, EXTENDED RELEASE TRANSDERMAL at 08:08

## 2024-08-21 RX ADMIN — Medication 100 MG: at 08:08

## 2024-08-21 RX ADMIN — LORAZEPAM 2 MG: 1 TABLET ORAL at 09:08

## 2024-08-21 RX ADMIN — PROPRANOLOL HYDROCHLORIDE 10 MG: 10 TABLET ORAL at 08:08

## 2024-08-21 RX ADMIN — DIAZEPAM 10 MG: 5 TABLET ORAL at 11:08

## 2024-08-21 RX ADMIN — PROPRANOLOL HYDROCHLORIDE 10 MG: 10 TABLET ORAL at 09:08

## 2024-08-21 RX ADMIN — ENOXAPARIN SODIUM 40 MG: 40 INJECTION SUBCUTANEOUS at 04:08

## 2024-08-21 RX ADMIN — OLANZAPINE 10 MG: 5 TABLET, ORALLY DISINTEGRATING ORAL at 09:08

## 2024-08-21 RX ADMIN — DIVALPROEX SODIUM 250 MG: 250 TABLET, DELAYED RELEASE ORAL at 09:08

## 2024-08-21 NOTE — SUBJECTIVE & OBJECTIVE
Interval History: Patient seen at bedside. Reports some tremulousness but denies hallucinations, anxiety, abdominal issues. Overall he is feeling well. Continue valium taper. Psychiatry consulted. Continue PEC for now.    Review of Systems   Constitutional:  Negative for activity change, chills, diaphoresis, fever and unexpected weight change.   HENT:  Negative for congestion and sore throat.    Respiratory:  Negative for cough, shortness of breath and wheezing.    Cardiovascular:  Negative for chest pain, palpitations and leg swelling.   Gastrointestinal:  Negative for abdominal pain, blood in stool, nausea and vomiting.   Genitourinary:  Negative for dysuria and hematuria.   Musculoskeletal:  Negative for arthralgias and neck pain.   Skin:  Negative for color change and rash.   Neurological:  Positive for tremors. Negative for dizziness, seizures and numbness.   Psychiatric/Behavioral:  Negative for hallucinations and suicidal ideas. The patient is not nervous/anxious.      Objective:     Vital Signs (Most Recent):  Temp: 98 °F (36.7 °C) (08/21/24 1000)  Pulse: 66 (08/21/24 1100)  Resp: 20 (08/21/24 1100)  BP: (!) 165/90 (08/21/24 1100)  SpO2: 95 % (08/21/24 1100) Vital Signs (24h Range):  Temp:  [97.8 °F (36.6 °C)-98.3 °F (36.8 °C)] 98 °F (36.7 °C)  Pulse:  [] 66  Resp:  [16-26] 20  SpO2:  [92 %-98 %] 95 %  BP: (136-177)/() 165/90     Weight: 63.5 kg (140 lb)  Body mass index is 23.3 kg/m².    Intake/Output Summary (Last 24 hours) at 8/21/2024 1118  Last data filed at 8/21/2024 0020  Gross per 24 hour   Intake 1096.67 ml   Output --   Net 1096.67 ml         Physical Exam  Vitals reviewed.   Constitutional:       General: He is not in acute distress.     Appearance: He is well-developed.   HENT:      Head: Normocephalic and atraumatic.   Eyes:      Extraocular Movements: Extraocular movements intact.      Pupils: Pupils are equal, round, and reactive to light.   Neck:      Vascular: No JVD.       Trachea: No tracheal deviation.   Cardiovascular:      Rate and Rhythm: Normal rate and regular rhythm.      Heart sounds: No murmur heard.     No friction rub. No gallop.   Pulmonary:      Effort: No respiratory distress.      Breath sounds: Normal breath sounds. No wheezing or rales.   Abdominal:      General: Bowel sounds are normal. There is no distension.      Palpations: Abdomen is soft. There is no mass.      Tenderness: There is no abdominal tenderness.   Musculoskeletal:         General: No deformity.      Cervical back: Neck supple.   Lymphadenopathy:      Cervical: No cervical adenopathy.   Skin:     General: Skin is warm and dry.      Findings: No rash.   Neurological:      Mental Status: He is alert and oriented to person, place, and time.      Motor: Tremor present.             Significant Labs: All pertinent labs within the past 24 hours have been reviewed.  CBC:   Recent Labs   Lab 08/20/24  1120 08/21/24  0333   WBC 5.10 4.24   HGB 16.5 13.3*   HCT 47.5 39.1*   * 87*     CMP:   Recent Labs   Lab 08/20/24  1120 08/21/24  0333    138   K 3.3* 3.8    108   CO2 24 21*   * 86   BUN 5* 8   CREATININE 0.7 0.7   CALCIUM 8.7 8.1*   PROT 7.5 6.2   ALBUMIN 3.7 3.0*   BILITOT 0.7 1.4*   ALKPHOS 56 44*   * 83*   * 85*   ANIONGAP 10 9       Significant Imaging: I have reviewed all pertinent imaging results/findings within the past 24 hours.

## 2024-08-21 NOTE — ED NOTES
Assumed care of patient. Patient is alert and resting comfortably in bed in NAD. BP, cardiac, and O2 monitoring continued - all other uncessary medical equipment removed from room. Patient remains in paper scrubs per hospital policy. All belongings remain outside of room .  remains at bedside with direct visual contact. Patient updated on plan of care, pt denies any needs or complaints at this time. Bed locked in lowest position, side rails up x2. VSS. Will continue to monitor.

## 2024-08-21 NOTE — ASSESSMENT & PLAN NOTE
-Pt. PEC'd at outpatient psych clinic for grave disability. Per chart review, pt. On medications depakote and zyprexa, will continue for now  - continue CEC for now  -Psych consulted for assistance while admitted

## 2024-08-21 NOTE — ED NOTES
Nurses Note -- 4 Eyes      8/21/2024   3:34 AM      Skin assessed during: Q Shift Change      [x] No Altered Skin Integrity Present    [x]Prevention Measures Documented      [] Yes- Altered Skin Integrity Present or Discovered   [] LDA Added if Not in Epic (Describe Wound)   [] New Altered Skin Integrity was Present on Admit and Documented in LDA   [] Wound Image Taken    Wound Care Consulted? No    Attending Nurse:  Roberta Jerome RN/Staff Member:   Eva

## 2024-08-21 NOTE — NURSING
Received pt ED via wheelchair. AAOX4. VVS. CIWA Q8. No s/s of discomfort or distress. Pt oriented to room. Bed alarm. Pt PEC with sitter @bedside. Call light in reach. Bed in lowest position.

## 2024-08-21 NOTE — MEDICAL/APP STUDENT
"CONSULTATION LIAISON PSYCHIATRY INITIAL EVALUATION    Patient Name: Armando rGeen  MRN: [id number]  Patient Class: IP- Inpatient  Admission Date: 8/20/2024  Attending Physician: Evelyne Lara MD      HPI:   Armando Green is a 59 y.o. male with past psychiatric history of bipolar schizophrenia, Alcohol use disorder & past pertinent medical history of osteoarthritis presents to the ED/admitted to the hospital for Alcohol withdrawal    Psychiatry consulted for "PEC for grave disability and alcohol intoxication and withdrawal symptoms"    Mr. Green was sleeping and awoken for an interview. He was in NAD and A+Ox4. The patient has been motivated to stop drinking and has been attending alcohol rehab visits in [facility]. He reports having had a couple drinks the morning before his appointment and was PEC'd and sent to [facility] ED. His last drink was Tuesday 8/20 morning. He denies any auditory or visual hallucinations. He reports that he is able to bathe and care for himself. He has no SI or thoughts of harming others. He lives with his son and grandchildren and works at [employer]. When he drinks he has about 28 drinks/week, one in the morning and 3 after work. He also smokes 2 pk/day since he was 20. The patient states he likes to have a drink and watch his grandchildren play.      Collateral with patient's permission:   Per the patients son, the patient is able to care for himself and get himself to work although the son reports he has been having problems at work due to his alcohol use. There has been no aggressive behavior witnessed by the son. The son says that he occasionally needs to tell the patient to shower.    Medical Review of Systems:  Not formally assessed    Psychiatric Review of Systems (is patient experiencing or having changes in):  sleep: yes  appetite: no  weight: no  energy/anergy: no  interest/pleasure/anhedonia: no  somatic symptoms: no  libido: no  anxiety/panic: " "no  guilty/hopelessness: no  concentration: no  Araceli:no  Psychosis: no  Trauma: no  S.I.B.s/risky behavior: no    Past Psychiatric History:  Previous Medication Trials: no  Previous Psychiatric Hospitalizations:no   Previous Suicide Attempts: no  History of Violence: no  Outpatient Psychiatrist: yes  Family Psychiatric History: no    Substance Abuse History (with emphasis over the last 12 months):  Recreational Drugs:  none  Use of Alcohol: heavy and history of blackouts  Tobacco Use:yes, 2pk/day since 20  Rehab History:yes    Social History:  Marital Status: not   Children: at least 1 son  Employment Status/Info: currently employed  :no  Education:  unsure  Special Ed: no  Housing Status: with family  Access to gun: no  Psychosocial Stressors: drug and alcohol  Functioning Relationships: good relationship with children    Legal History:  Past Charges/Incarcerations: yes  Pending charges:no    Mental Status Exam:  General Appearance: unremarkable  Behavior: normal; cooperative; reasonably friendly, pleasant, and polite; appropriate eye-contact; under good behavioral control  Involuntary Movements and Motor Activity:  tremulous when moving  Gait and Station: unable to assess - patient lying down or seated  Speech and Language: normal rate, rhythm, volume, tone, and pitch, mumbling  Mood: "pretty good"  Affect: constricted  Thought Process and Associations: intact; linear, goal-directed, organized, and logical; no loosening of associations noted  Thought Content and Perceptions:: no suicidal or homicidal ideation, no auditory or visual hallucinations, no paranoid ideation, no ideas of reference, no evidence of delusions or psychosis  Sensorium and Orientation: intact; alert with clear sensorium; oriented fully to person, place, time and situation  Recent and Remote Memory: grossly intact  Attention and Concentration: grossly intact  Fund of Knowledge: intact  Insight: intact  Judgment: intact    CAM " ICU positive? no      ASSESSMENT & RECOMMENDATIONS       Please contact ON CALL psychiatry service (24/7) for any acute issues that may arise.    Osman Cross, MS3   Psychiatry  [facility]  8/21/2024 10:04 AM        --------------------------------------------------------------------------------------------------------------------------------------------------------------------------------------------------------------------------------------    CONTINUED HISTORY & OBJECTIVE clinical data & findings reviewed and noted for above decision making    Past Medical/Surgical History:   Past Medical History:   Diagnosis Date    Alcohol abuse     Hepatitis C      Past Surgical History:   Procedure Laterality Date    OTHER SURGICAL HISTORY      right arm surgery    ROBOT-ASSISTED LAPAROSCOPIC REPAIR OF INGUINAL HERNIA Right 6/7/2021    Procedure: ROBOTIC REPAIR, HERNIA, INGUINAL (right possible left);  Surgeon: Los Perry MD;  Location: [facility];  Service: General;  Laterality: Right;    TONSILLECTOMY         Current Medications:   Scheduled Meds:    diazePAM  10 mg Oral Q6H    Followed by    diazePAM  10 mg Oral Q8H    Followed by    [START ON 8/22/2024] diazePAM  5 mg Oral Q6H    Followed by    [START ON 8/23/2024] diazePAM  5 mg Oral Q8H    divalproex  250 mg Oral QHS    enoxparin  40 mg Subcutaneous Q24H (prophylaxis, 1700)    folic acid  1 mg Oral Daily    multivitamin  1 tablet Oral Daily    nicotine  1 patch Transdermal Daily    OLANZapine zydis        OLANZapine zydis  10 mg Oral QHS    propranoloL  10 mg Oral BID    thiamine  100 mg Oral Daily     PRN Meds:   Current Facility-Administered Medications:     acetaminophen, 650 mg, Oral, Q6H PRN    cloNIDine, 0.1 mg, Oral, Q4H PRN    dextrose 10%, 12.5 g, Intravenous, PRN    dextrose 10%, 25 g, Intravenous, PRN    glucagon (human recombinant), 1 mg, Intramuscular, PRN    glucose, 16 g, Oral, PRN    glucose, 24 g, Oral, PRN    LORazepam, 2 mg,  Oral, Q4H PRN    melatonin, 6 mg, Oral, Nightly PRN    naloxone, 0.02 mg, Intravenous, PRN    OLANZapine zydis, , ,     ondansetron, 4 mg, Intravenous, Q8H PRN    prochlorperazine, 5 mg, Intravenous, Q6H PRN    sodium chloride 0.9%, 10 mL, Intravenous, PRN    Allergies:   Review of patient's allergies indicates:   Allergen Reactions    Penicillins Rash       Vitals  Vitals:    08/21/24 1000   BP: (!) 158/95   Pulse: 76   Resp: 16   Temp: 98 °F (36.7 °C)       Labs/Imaging/Studies:  Recent Results (from the past 24 hour(s))   CBC auto differential    Collection Time: 08/20/24 11:20 AM   Result Value Ref Range    WBC 5.10 3.90 - 12.70 K/uL    RBC 4.84 4.60 - 6.20 M/uL    Hemoglobin 16.5 14.0 - 18.0 g/dL    Hematocrit 47.5 40.0 - 54.0 %    MCV 98 82 - 98 fL    MCH 34.1 (H) 27.0 - 31.0 pg    MCHC 34.7 32.0 - 36.0 g/dL    RDW 12.5 11.5 - 14.5 %    Platelets 134 (L) 150 - 450 K/uL    MPV 10.5 9.2 - 12.9 fL    Immature Granulocytes 1.0 (H) 0.0 - 0.5 %    Gran # (ANC) 2.0 1.8 - 7.7 K/uL    Immature Grans (Abs) 0.05 (H) 0.00 - 0.04 K/uL    Lymph # 2.2 1.0 - 4.8 K/uL    Mono # 0.6 0.3 - 1.0 K/uL    Eos # 0.1 0.0 - 0.5 K/uL    Baso # 0.07 0.00 - 0.20 K/uL    nRBC 0 0 /100 WBC    Gran % 39.4 38.0 - 73.0 %    Lymph % 43.5 18.0 - 48.0 %    Mono % 12.0 4.0 - 15.0 %    Eosinophil % 2.7 0.0 - 8.0 %    Basophil % 1.4 0.0 - 1.9 %    Differential Method Automated    Comprehensive metabolic panel    Collection Time: 08/20/24 11:20 AM   Result Value Ref Range    Sodium 138 136 - 145 mmol/L    Potassium 3.3 (L) 3.5 - 5.1 mmol/L    Chloride 104 95 - 110 mmol/L    CO2 24 23 - 29 mmol/L    Glucose 135 (H) 70 - 110 mg/dL    BUN 5 (L) 6 - 20 mg/dL    Creatinine 0.7 0.5 - 1.4 mg/dL    Calcium 8.7 8.7 - 10.5 mg/dL    Total Protein 7.5 6.0 - 8.4 g/dL    Albumin 3.7 3.5 - 5.2 g/dL    Total Bilirubin 0.7 0.1 - 1.0 mg/dL    Alkaline Phosphatase 56 55 - 135 U/L     (H) 10 - 40 U/L     (H) 10 - 44 U/L    eGFR >60.0 >60 mL/min/1.73 m^2     Anion Gap 10 8 - 16 mmol/L   Acetaminophen level    Collection Time: 08/20/24 11:20 AM   Result Value Ref Range    Acetaminophen (Tylenol), Serum <3.0 (L) 10.0 - 20.0 ug/mL   Salicylate level    Collection Time: 08/20/24 11:20 AM   Result Value Ref Range    Salicylate Lvl <5.0 (L) 15.0 - 30.0 mg/dL   Toxicology screen, urine    Collection Time: 08/20/24 11:21 AM   Result Value Ref Range    Alcohol, Urine 403 (A) <10 mg/dL    Benzodiazepines Negative Negative    Methadone metabolites Negative Negative    Cocaine (Metab.) Negative Negative    Opiate Scrn, Ur Negative Negative    Barbiturate Screen, Ur Negative Negative    Amphetamine Screen, Ur Negative Negative    THC Negative Negative    Phencyclidine Negative Negative    Creatinine, Urine 33.0 23.0 - 375.0 mg/dL    Toxicology Information SEE COMMENT    Urinalysis, Reflex to Urine Culture Urine, Clean Catch    Collection Time: 08/20/24 11:21 AM    Specimen: Urine   Result Value Ref Range    Specimen UA Urine, Clean Catch     Color, UA Yellow Yellow, Straw, Anisa    Appearance, UA Clear Clear    pH, UA 6.0 5.0 - 8.0    Specific Gravity, UA 1.005 1.005 - 1.030    Protein, UA 2+ (A) Negative    Glucose, UA Negative Negative    Ketones, UA Negative Negative    Bilirubin (UA) Negative Negative    Occult Blood UA Trace (A) Negative    Nitrite, UA Negative Negative    Leukocytes, UA Negative Negative   Urinalysis Microscopic    Collection Time: 08/20/24 11:21 AM   Result Value Ref Range    RBC, UA 1 0 - 4 /hpf    WBC, UA 1 0 - 5 /hpf    Bacteria Rare None-Occ /hpf    Squam Epithel, UA 0 /hpf    Hyaline Casts, UA 0 0-1/lpf /lpf    Microscopic Comment SEE COMMENT    Ethanol    Collection Time: 08/20/24  4:17 PM   Result Value Ref Range    Alcohol, Serum 176 (H) <10 mg/dL   EKG 12-lead    Collection Time: 08/20/24  5:38 PM   Result Value Ref Range    QRS Duration 82 ms    [facility] QTC Calculation 424 ms   Comprehensive Metabolic Panel (CMP)    Collection Time: 08/21/24  3:33 AM    Result Value Ref Range    Sodium 138 136 - 145 mmol/L    Potassium 3.8 3.5 - 5.1 mmol/L    Chloride 108 95 - 110 mmol/L    CO2 21 (L) 23 - 29 mmol/L    Glucose 86 70 - 110 mg/dL    BUN 8 6 - 20 mg/dL    Creatinine 0.7 0.5 - 1.4 mg/dL    Calcium 8.1 (L) 8.7 - 10.5 mg/dL    Total Protein 6.2 6.0 - 8.4 g/dL    Albumin 3.0 (L) 3.5 - 5.2 g/dL    Total Bilirubin 1.4 (H) 0.1 - 1.0 mg/dL    Alkaline Phosphatase 44 (L) 55 - 135 U/L    AST 83 (H) 10 - 40 U/L    ALT 85 (H) 10 - 44 U/L    eGFR >60.0 >60 mL/min/1.73 m^2    Anion Gap 9 8 - 16 mmol/L   CBC with Automated Differential    Collection Time: 08/21/24  3:33 AM   Result Value Ref Range    WBC 4.24 3.90 - 12.70 K/uL    RBC 3.79 (L) 4.60 - 6.20 M/uL    Hemoglobin 13.3 (L) 14.0 - 18.0 g/dL    Hematocrit 39.1 (L) 40.0 - 54.0 %     (H) 82 - 98 fL    MCH 35.1 (H) 27.0 - 31.0 pg    MCHC 34.0 32.0 - 36.0 g/dL    RDW 12.8 11.5 - 14.5 %    Platelets 87 (L) 150 - 450 K/uL    MPV 10.5 9.2 - 12.9 fL    Immature Granulocytes 1.7 (H) 0.0 - 0.5 %    Gran # (ANC) 1.8 1.8 - 7.7 K/uL    Immature Grans (Abs) 0.07 (H) 0.00 - 0.04 K/uL    Lymph # 1.8 1.0 - 4.8 K/uL    Mono # 0.4 0.3 - 1.0 K/uL    Eos # 0.1 0.0 - 0.5 K/uL    Baso # 0.06 0.00 - 0.20 K/uL    nRBC 0 0 /100 WBC    Gran % 43.1 38.0 - 73.0 %    Lymph % 42.9 18.0 - 48.0 %    Mono % 8.5 4.0 - 15.0 %    Eosinophil % 2.4 0.0 - 8.0 %    Basophil % 1.4 0.0 - 1.9 %    Differential Method Automated    Protime-INR    Collection Time: 08/21/24  3:33 AM   Result Value Ref Range    Prothrombin Time 11.3 9.0 - 12.5 sec    INR 1.0 0.8 - 1.2     Imaging Results              X-Ray Chest AP Portable (Final result)  Result time 08/20/24 21:03:07      Final result by Damon Wiley MD (08/20/24 21:03:07)                   Impression:      No radiographic acute intrathoracic process seen on this single view.      Electronically signed by: Damon Wiley MD  Date:    08/20/2024  Time:    21:03               Narrative:    EXAMINATION:  XR CHEST  AP PORTABLE    CLINICAL HISTORY:  Tacycardia;    TECHNIQUE:  Single frontal view of the chest was performed.    COMPARISON:  Chest radiograph 03/14/2018    FINDINGS:  Monitoring leads overlie the chest.  Patient is slightly rotated.    No detrimental change.Few scattered linear opacities throughout the lungs consistent with minimal platelike scarring versus atelectasis.  The lungs are otherwise well expanded without consolidation, pleural effusion or pneumothorax.    Cardiomediastinal silhouette is midline and within normal limits for age, stable.  Pulmonary vasculature and hilar contours are within normal limits.    No acute osseous process seen.  PA and lateral views can be obtained.

## 2024-08-21 NOTE — CONSULTS
"CONSULTATION LIAISON PSYCHIATRY INITIAL EVALUATION    Patient Name: Armando Green  MRN: [id number]  Patient Class: IP- Inpatient  Admission Date: 8/20/2024  Attending Physician: Evelyne Lara MD      HPI:   Armando Green is a 59 y.o. male with past psychiatric history of alcohol use disorder, depression, and anxiety & past pertinent medical history of hepatitis C presents to the ED/admitted to the hospital for Alcohol withdrawal    Psychiatry consulted for "PEC for grave disability and alcohol intoxication and withdrawal symptoms"     On psych exam, Mr. Green was sleeping and awoken for an interview. He was in NAD and A+Ox4. Patient states he was brought to the hospital after a visit with his psychiatrist, Ashley, was concerned about his alcohol intake. Patient states he actually has decreased his alcohol intake over last dew months. The patient has been motivated to stop drinking due to being surrounded by his grandchildren and being busy with work. Has been to AA meetings in the past but does not feel he requires currently. However, feels his current drinking regimen of  8 drinks/week, one in the morning and 3 after work, is appropriate. Previously had been drinking about a fifth daily. Drinks Srikanth Gary and Coke, 1 drink = 1 shot of Srikanth Bean. The patient states he likes to have a drink on the porch and watch his grandchildren play. He reports having had a couple drinks the morning before his appointment and was PEC'd for grave disability and sent to [facility] ED. His last drink was Tuesday 8/20 morning. Longest he's been sober is 2 weeks, has never been to rehab and does not have a desire to right now. He denies any current or previous auditory or visual hallucinations. He reports that he is able to bathe and care for himself. He has no SI or thoughts of harming others. States he has been prescribed depakote and zyprexa for 6 months to help him sleep; denies he has a history of schizophrenia or bipolar " disorder. Denied history of symptoms consistent with either disorder. When asked about sleep, states he has been sleeping more often than usual since decreased his alcohol intake. Denies previous psychiatric hospitalization.  He lives with his son and grandchildren and works at [email or web address]. Son drives patient to and from work daily. He also smokes 2 pk/day since he was 20. No current or previous illicit drug use. When brought up concern for patient being at risk for complicated withdrawal, patient states he has never had a seizure and does believe he could have a seizure, unable to state reasoning behind this belief.        Collateral with patient's permission:   Per the patients son, the patient is able to get  to work although the son reports he has been having problems at work due to his alcohol use. There has been no aggressive behavior witnessed by the son. The son says that he often needs to tell the patient to shower. Has not been sleeping well recently. States concern about patient's heavy degree of alcohol use but agrees he has shown no behavior consistent with being an intentional harm to himself or others. States patient may have been diagnosed with paranoid schizophrenia versus bipolar schizophrenia in the past - is unsure of why. Denies noticing patient displaying paranoia or endorsing AVH. States patient takes medication for sleep but is unsure of what those medications are. Does state patient will have periods of time with decreased sleep, increased talkativeness and impulsivity; however, does not feel patient is having such an episode currently. Patient was in [facility] a long time ago for possession of a firearm.     Spoke with patient's NP, Ashley Ruiz, who states patient arrived to appointment clearly intoxicated and malodorous with son. Patient has a history of paranoid schizophrenia that was diagnosed at [place] during a previous hospitalization; has not endorses paranoia or AH to current  "provider, has been on zyprexa and depakote since then. Provider had concern for patient's alcohol use and that he might go home and have an accident or stop drinking and have a seizure. She PEC'd pt for grave disability. Denies pt was aggressive at appointment.     Medical Review of Systems:  Pertinent items are noted in HPI.    Psychiatric Review of Systems (is patient experiencing or having changes in):  sleep: yes  appetite: no  weight: no  energy/anergy: no  interest/pleasure/anhedonia: no  somatic symptoms: no  libido: not assessed   anxiety/panic: no  guilty/hopelessness: no  concentration: no  Araceli:no  Psychosis: no  Trauma: not assessed   S.I.B.s/risky behavior: yes, excessive alcohol use     Past Psychiatric History:  Previous Medication Trials: yes, zyprexa, depakote   Previous Psychiatric Hospitalizations:unclear; patient and patient's son densalud, but PEC from NP states 2016 inpatient hospitalization at [place]   Previous Suicide Attempts: no  History of Violence: states he has a "bad temper"  Outpatient Psychiatrist: yes, WANDER Ruiz   Family Psychiatric History: yes, father with alcohol user disorder     Substance Abuse History (with emphasis over the last 12 months):  Recreational Drugs:  denies  Use of Alcohol: heavy  Tobacco Use:yes  Rehab History:no    Social History:  Marital Status: not   Children: 2  Employment Status/Info: currently employed  :no  Education: high school diploma/GED  Special Ed: no  Housing Status: with family  Access to gun: no  Psychosocial Stressors: drug and alcohol  Functioning Relationships: good support system and good relationship with children    Legal History:  Past Charges/Incarcerations: yes  Pending charges:not assessed     Mental Status Exam:  General Appearance: appears stated age  Behavior: normal  Involuntary Movements and Motor Activity: +tremors  Gait and Station: unable to assess - patient lying down or seated  Speech and Language: normal " "rhythm, normal volume, slowed  Mood: "alright"  Affect: low-key  Thought Process and Associations: goal-directed, organized  Thought Content and Perceptions:: no suicidal or homicidal ideation, no auditory or visual hallucinations, no paranoid ideation, no ideas of reference, no evidence of delusions or psychosis  Sensorium and Orientation: intact; alert with clear sensorium; oriented fully to person, place, time and situation  Recent and Remote Memory: recent memory intact, remote memory impaired, registers 3/3 objects, recalls 0/3 objects at 5 minutes  Attention and Concentration: grossly intact, attentive to the conversation and not readily distractible  Fund of Knowledge: vocabulary appropriate  Insight:  limited  Judgment: limited    CAM ICU positive? no    DSM-5 SUBSTANCE USE DISORDER CRITERIA   Mild (1-3), Moderate (4-5), Severe (?6)  [x] Often take in larger amounts or over a longer period of time than was intended.  [] Persistent desire or unsuccessful efforts to cut down or control use.  [] Great deal of time spent in activities necessary to obtain substance, use, or recover from effects.  [x] Craving/strong desire for substance or urge to use.  [x] Use resulting in failure to fulfill major role obligations at home, work or school.  [x] Social, occupational, recreational activities decreased because of use.  [x] Continued use despite having persistent or recurrent social or interpersonal problems cause or exacerbated by the substance.  [x] Recurrent use in situations in which it is physically hazardous.  [] Use despite physical or psychological problems that are likely to have been caused or exacerbated by the substance.  [] Tolerance, as defined by either of the following:  A need for markedly increased amounts of substance to achieve intoxication or desired effect.  -OR-   A markedly diminished effect with continued use of the same amount of substance.  [x] Withdrawal, as manifested by the " following:  The characteristic withdrawal syndrome for substance.  -AND-  Substance is taken to relieve or avoid withdrawal symptoms.    ARE THE CRITERIA MET FOR DSM-5 SUBSTANCE USE DISORDER: Yes  ACCURACY OF REPORTING: Current clinical impression is that patient appears to be accurately reporting symptomatology.    ASSESSMENT & RECOMMENDATIONS   Alcohol Withdrawal   Alcohol Use Disorder - severe       Complicated Alcohol Withdrawal  Alcohol withdrawal usually begins within 6-8 hours after an abrupt reduction in alcohol intake but may not manifest for up to 72 hours. Withdrawal generally peaks within 10-30 hours and lasts for 3-7 days.  LEANNE <10 on admit. Positive history of alcohol withdrawal seizures. Agree with valium taper.  Valium taper starting at 10mg Q6H. Goal is to decrease Valium 5-10 mg daily as tolerated by the patient's VS.  Clinical [place] Withdrawal Assessment of Alcohol Scale (CIWA-Ar)  If CIWA is <8 and vital signs are stable, no PRN medication is required. Repeat vital signs q4 and the CIWA q8.  If CIWA is between 8 and 15, give Lorazepam 2 mg PO/IM q4 PRN and complete vital signs q2 and the CIWA q4.  If CIWA is ?15 or DBP ?110 mmHg, give Lorazepam 2 mg PO/IM q1 until patient has a CIWA of <15 or DBP <110 mmHg. CIWA and vital signs checked q1 until patients CIWA is <15 and DBP <110 mmHg  Call MD for for SBP >180, DBP >120, or HR >110 or if patient requires ?6 mg of lorazepam in 3 hours.  Recommended lab work includes CBC, CMP, liver panel (prior to initiating taper), B12, folate, iron panel, thiamine level.  Agree with IV thiamine supplementation. Supplement Folate 1 mg daily, MVI daily.  Seizure precautions in place. Seizures generally occur between 12-48 hours after alcohol cessation. Monitor for hypoglycemia, hypocalcemia, and hypomagnesemia as these can predispose to seizure.  Monitor for AMS, ataxia and nystagmus as these can be signs of Wernicke's Encephalopathy.    Reported Hx of  Schizophrenia vs SIPD  Can continue outpatient regimen of zyprexa 10mg nightly  Can continue outpatient regimen of depakote 250mg nightly     RISK ASSESSMENT  Continue PEC for grave disability; will continue to reevaluate need for PEC    FOLLOW UP  Will follow up while in house    DISPOSITION - once medically cleared:    Defer to medical team    Please contact ON CALL psychiatry service (24/7) for any acute issues that may arise.    Dr. Cristal Diaz   Psychiatry  [facility]  8/21/2024 9:26 AM        --------------------------------------------------------------------------------------------------------------------------------------------------------------------------------------------------------------------------------------    CONTINUED HISTORY & OBJECTIVE clinical data & findings reviewed and noted for above decision making    Past Medical/Surgical History:   Past Medical History:   Diagnosis Date    Alcohol abuse     Hepatitis C      Past Surgical History:   Procedure Laterality Date    OTHER SURGICAL HISTORY      right arm surgery    ROBOT-ASSISTED LAPAROSCOPIC REPAIR OF INGUINAL HERNIA Right 6/7/2021    Procedure: ROBOTIC REPAIR, HERNIA, INGUINAL (right possible left);  Surgeon: Los Perry MD;  Location: [facility];  Service: General;  Laterality: Right;    TONSILLECTOMY         Current Medications:   Scheduled Meds:    diazePAM  10 mg Oral Q6H    Followed by    diazePAM  10 mg Oral Q8H    Followed by    [START ON 8/22/2024] diazePAM  5 mg Oral Q6H    Followed by    [START ON 8/23/2024] diazePAM  5 mg Oral Q8H    divalproex  250 mg Oral QHS    enoxparin  40 mg Subcutaneous Q24H (prophylaxis, 1700)    folic acid  1 mg Oral Daily    multivitamin  1 tablet Oral Daily    nicotine  1 patch Transdermal Daily    OLANZapine zydis        OLANZapine zydis  10 mg Oral QHS    propranoloL  10 mg Oral BID    thiamine  100 mg Oral Daily     PRN Meds:   Current Facility-Administered Medications:      acetaminophen, 650 mg, Oral, Q6H PRN    cloNIDine, 0.1 mg, Oral, Q4H PRN    dextrose 10%, 12.5 g, Intravenous, PRN    dextrose 10%, 25 g, Intravenous, PRN    glucagon (human recombinant), 1 mg, Intramuscular, PRN    glucose, 16 g, Oral, PRN    glucose, 24 g, Oral, PRN    LORazepam, 2 mg, Oral, Q4H PRN    melatonin, 6 mg, Oral, Nightly PRN    naloxone, 0.02 mg, Intravenous, PRN    OLANZapine zydis, , ,     ondansetron, 4 mg, Intravenous, Q8H PRN    prochlorperazine, 5 mg, Intravenous, Q6H PRN    sodium chloride 0.9%, 10 mL, Intravenous, PRN    Allergies:   Review of patient's allergies indicates:   Allergen Reactions    Penicillins Rash       Vitals  Vitals:    08/21/24 0910   BP: (!) 167/111   Pulse: 80   Resp: 16   Temp:        Labs/Imaging/Studies:  Recent Results (from the past 24 hour(s))   CBC auto differential    Collection Time: 08/20/24 11:20 AM   Result Value Ref Range    WBC 5.10 3.90 - 12.70 K/uL    RBC 4.84 4.60 - 6.20 M/uL    Hemoglobin 16.5 14.0 - 18.0 g/dL    Hematocrit 47.5 40.0 - 54.0 %    MCV 98 82 - 98 fL    MCH 34.1 (H) 27.0 - 31.0 pg    MCHC 34.7 32.0 - 36.0 g/dL    RDW 12.5 11.5 - 14.5 %    Platelets 134 (L) 150 - 450 K/uL    MPV 10.5 9.2 - 12.9 fL    Immature Granulocytes 1.0 (H) 0.0 - 0.5 %    Gran # (ANC) 2.0 1.8 - 7.7 K/uL    Immature Grans (Abs) 0.05 (H) 0.00 - 0.04 K/uL    Lymph # 2.2 1.0 - 4.8 K/uL    Mono # 0.6 0.3 - 1.0 K/uL    Eos # 0.1 0.0 - 0.5 K/uL    Baso # 0.07 0.00 - 0.20 K/uL    nRBC 0 0 /100 WBC    Gran % 39.4 38.0 - 73.0 %    Lymph % 43.5 18.0 - 48.0 %    Mono % 12.0 4.0 - 15.0 %    Eosinophil % 2.7 0.0 - 8.0 %    Basophil % 1.4 0.0 - 1.9 %    Differential Method Automated    Comprehensive metabolic panel    Collection Time: 08/20/24 11:20 AM   Result Value Ref Range    Sodium 138 136 - 145 mmol/L    Potassium 3.3 (L) 3.5 - 5.1 mmol/L    Chloride 104 95 - 110 mmol/L    CO2 24 23 - 29 mmol/L    Glucose 135 (H) 70 - 110 mg/dL    BUN 5 (L) 6 - 20 mg/dL    Creatinine 0.7 0.5 -  1.4 mg/dL    Calcium 8.7 8.7 - 10.5 mg/dL    Total Protein 7.5 6.0 - 8.4 g/dL    Albumin 3.7 3.5 - 5.2 g/dL    Total Bilirubin 0.7 0.1 - 1.0 mg/dL    Alkaline Phosphatase 56 55 - 135 U/L     (H) 10 - 40 U/L     (H) 10 - 44 U/L    eGFR >60.0 >60 mL/min/1.73 m^2    Anion Gap 10 8 - 16 mmol/L   Acetaminophen level    Collection Time: 08/20/24 11:20 AM   Result Value Ref Range    Acetaminophen (Tylenol), Serum <3.0 (L) 10.0 - 20.0 ug/mL   Salicylate level    Collection Time: 08/20/24 11:20 AM   Result Value Ref Range    Salicylate Lvl <5.0 (L) 15.0 - 30.0 mg/dL   Toxicology screen, urine    Collection Time: 08/20/24 11:21 AM   Result Value Ref Range    Alcohol, Urine 403 (A) <10 mg/dL    Benzodiazepines Negative Negative    Methadone metabolites Negative Negative    Cocaine (Metab.) Negative Negative    Opiate Scrn, Ur Negative Negative    Barbiturate Screen, Ur Negative Negative    Amphetamine Screen, Ur Negative Negative    THC Negative Negative    Phencyclidine Negative Negative    Creatinine, Urine 33.0 23.0 - 375.0 mg/dL    Toxicology Information SEE COMMENT    Urinalysis, Reflex to Urine Culture Urine, Clean Catch    Collection Time: 08/20/24 11:21 AM    Specimen: Urine   Result Value Ref Range    Specimen UA Urine, Clean Catch     Color, UA Yellow Yellow, Straw, Anisa    Appearance, UA Clear Clear    pH, UA 6.0 5.0 - 8.0    Specific Gravity, UA 1.005 1.005 - 1.030    Protein, UA 2+ (A) Negative    Glucose, UA Negative Negative    Ketones, UA Negative Negative    Bilirubin (UA) Negative Negative    Occult Blood UA Trace (A) Negative    Nitrite, UA Negative Negative    Leukocytes, UA Negative Negative   Urinalysis Microscopic    Collection Time: 08/20/24 11:21 AM   Result Value Ref Range    RBC, UA 1 0 - 4 /hpf    WBC, UA 1 0 - 5 /hpf    Bacteria Rare None-Occ /hpf    Squam Epithel, UA 0 /hpf    Hyaline Casts, UA 0 0-1/lpf /lpf    Microscopic Comment SEE COMMENT    Ethanol    Collection Time: 08/20/24   4:17 PM   Result Value Ref Range    Alcohol, Serum 176 (H) <10 mg/dL   EKG 12-lead    Collection Time: 08/20/24  5:38 PM   Result Value Ref Range    QRS Duration 82 ms    [facility] QTC Calculation 424 ms   Comprehensive Metabolic Panel (CMP)    Collection Time: 08/21/24  3:33 AM   Result Value Ref Range    Sodium 138 136 - 145 mmol/L    Potassium 3.8 3.5 - 5.1 mmol/L    Chloride 108 95 - 110 mmol/L    CO2 21 (L) 23 - 29 mmol/L    Glucose 86 70 - 110 mg/dL    BUN 8 6 - 20 mg/dL    Creatinine 0.7 0.5 - 1.4 mg/dL    Calcium 8.1 (L) 8.7 - 10.5 mg/dL    Total Protein 6.2 6.0 - 8.4 g/dL    Albumin 3.0 (L) 3.5 - 5.2 g/dL    Total Bilirubin 1.4 (H) 0.1 - 1.0 mg/dL    Alkaline Phosphatase 44 (L) 55 - 135 U/L    AST 83 (H) 10 - 40 U/L    ALT 85 (H) 10 - 44 U/L    eGFR >60.0 >60 mL/min/1.73 m^2    Anion Gap 9 8 - 16 mmol/L   CBC with Automated Differential    Collection Time: 08/21/24  3:33 AM   Result Value Ref Range    WBC 4.24 3.90 - 12.70 K/uL    RBC 3.79 (L) 4.60 - 6.20 M/uL    Hemoglobin 13.3 (L) 14.0 - 18.0 g/dL    Hematocrit 39.1 (L) 40.0 - 54.0 %     (H) 82 - 98 fL    MCH 35.1 (H) 27.0 - 31.0 pg    MCHC 34.0 32.0 - 36.0 g/dL    RDW 12.8 11.5 - 14.5 %    Platelets 87 (L) 150 - 450 K/uL    MPV 10.5 9.2 - 12.9 fL    Immature Granulocytes 1.7 (H) 0.0 - 0.5 %    Gran # (ANC) 1.8 1.8 - 7.7 K/uL    Immature Grans (Abs) 0.07 (H) 0.00 - 0.04 K/uL    Lymph # 1.8 1.0 - 4.8 K/uL    Mono # 0.4 0.3 - 1.0 K/uL    Eos # 0.1 0.0 - 0.5 K/uL    Baso # 0.06 0.00 - 0.20 K/uL    nRBC 0 0 /100 WBC    Gran % 43.1 38.0 - 73.0 %    Lymph % 42.9 18.0 - 48.0 %    Mono % 8.5 4.0 - 15.0 %    Eosinophil % 2.4 0.0 - 8.0 %    Basophil % 1.4 0.0 - 1.9 %    Differential Method Automated    Protime-INR    Collection Time: 08/21/24  3:33 AM   Result Value Ref Range    Prothrombin Time 11.3 9.0 - 12.5 sec    INR 1.0 0.8 - 1.2     Imaging Results              X-Ray Chest AP Portable (Final result)  Result time 08/20/24 21:03:07      Final result by  Damon Wiley MD (08/20/24 21:03:07)                   Impression:      No radiographic acute intrathoracic process seen on this single view.      Electronically signed by: Damon iWley MD  Date:    08/20/2024  Time:    21:03               Narrative:    EXAMINATION:  XR CHEST AP PORTABLE    CLINICAL HISTORY:  Tacycardia;    TECHNIQUE:  Single frontal view of the chest was performed.    COMPARISON:  Chest radiograph 03/14/2018    FINDINGS:  Monitoring leads overlie the chest.  Patient is slightly rotated.    No detrimental change.Few scattered linear opacities throughout the lungs consistent with minimal platelike scarring versus atelectasis.  The lungs are otherwise well expanded without consolidation, pleural effusion or pneumothorax.    Cardiomediastinal silhouette is midline and within normal limits for age, stable.  Pulmonary vasculature and hilar contours are within normal limits.    No acute osseous process seen.  PA and lateral views can be obtained.

## 2024-08-21 NOTE — H&P
[facility] - Emergency Dept  [facility] Medicine  History & Physical    Patient Name: Armando Green  MRN: [id number]  Patient Class: IP- Inpatient  Admission Date: 8/20/2024  Attending Physician: Stuart Brock MD   Primary Care Provider: Agustin Farnsworth MD         Patient information was obtained from patient, past medical records, and ER records.     Subjective:     Principal Problem:Alcohol withdrawal    Chief Complaint:   Chief Complaint   Patient presents with    Mental Health Problem     Arrives with TINO with PEC for gravely disabled. ETOH abuse and in withdrawals hx of schizophrenia         HPI: 60 yo M with PMHx alcohol abuse who presents to the ED with police from [facility] for alcohol withdrawal concerns and delirium. Pt. Reports was taken to psych appointment by son today, but the staff at the clinic noted pt. Delirious, sweating, tremulous. He reportedly started becoming aggressive and was unable to be reasoned with. Provider recommended he be treated for alcohol withdrawal after obtaining collateral from son. He was placed under PEC and police were called and brought patient to the ED. At time of my assessment, pt. Angry about being in the ED, but he denies any SI or HI. He does not display psychotic behavior and appears alert and oriented x 4, but sleepy after being given IV ativan.    Per notes from [facility] today: Since his last visit, he has been tying to detox by reducing his alcohol intake.he wakes up daily with tremors and shakes, has been vomiting every morning, very anxious, has to drink as soon as he gets up. He has been missing days of workUsual estimated consumption estimated to be just over 1/5 daily. He has been drinking for many years, has never been to rehab. He has never had hospitalization from withdrawals, no reported seizures.    Psych hx, previously hospitalized in for 10 days at [place] in early 2016 for paranoid delusions, was put on Zyprexa and  Depakote. He has been on Zyprexa and Depakote since then.      Past Medical History:   Diagnosis Date    Alcohol abuse     Hepatitis C        Past Surgical History:   Procedure Laterality Date    OTHER SURGICAL HISTORY      right arm surgery    ROBOT-ASSISTED LAPAROSCOPIC REPAIR OF INGUINAL HERNIA Right 6/7/2021    Procedure: ROBOTIC REPAIR, HERNIA, INGUINAL (right possible left);  Surgeon: Los Perry MD;  Location: [facility];  Service: General;  Laterality: Right;    TONSILLECTOMY         Review of patient's allergies indicates:   Allergen Reactions    Penicillins Rash       No current facility-administered medications on file prior to encounter.     Current Outpatient Medications on File Prior to Encounter   Medication Sig    divalproex (DEPAKOTE) 500 MG TbEC Take 500 mg by mouth every evening.    OLANZapine (ZYPREXA) 15 MG Tab Take 15 mg by mouth every evening.    propranoloL (INDERAL) 10 MG tablet Take 10 mg by mouth 2 (two) times daily as needed.    VITAMIN B-1, MONONITRATE, 100 mg Tab Take 100 mg by mouth once daily.    chlordiazepoxide (LIBRIUM) 25 MG Cap Take 2 capsules (50 mg total) by mouth 4 (four) times daily for 1 day, THEN 2 capsules (50 mg total) 3 (three) times daily for 1 day, THEN 1 capsule (25 mg total) 4 (four) times daily for 1 day, THEN 1 capsule (25 mg total) 3 (three) times daily for 1 day, THEN 1 capsule (25 mg total) 2 (two) times a day for 1 day, THEN 1 capsule (25 mg total) every evening for 1 day.    ibuprofen (ADVIL,MOTRIN) 600 MG tablet Take 600 mg by mouth once daily.    mupirocin (BACTROBAN) 2 % ointment Apply topically 3 (three) times daily. (Patient not taking: Reported on 1/29/2024)     Family History    None       Tobacco Use    Smoking status: Every Day     Current packs/day: 1.00     Types: Cigarettes    Smokeless tobacco: Never   Substance and Sexual Activity    Alcohol use: Yes     Alcohol/week: 24.0 standard drinks of alcohol     Types: 24 Cans of beer per week      Comment: natty daily    Drug use: No     Comment: former pill abuser; denies IVDA    Sexual activity: Not Currently     Partners: Female     Review of Systems   Constitutional:  Positive for diaphoresis. Negative for activity change, chills, fever and unexpected weight change.   HENT:  Negative for congestion and sore throat.    Respiratory:  Negative for cough, shortness of breath and wheezing.    Cardiovascular:  Negative for chest pain, palpitations and leg swelling.   Gastrointestinal:  Negative for abdominal pain, blood in stool, nausea and vomiting.   Genitourinary:  Negative for dysuria and hematuria.   Musculoskeletal:  Negative for arthralgias and neck pain.   Skin:  Negative for color change and rash.   Neurological:  Positive for tremors. Negative for dizziness, seizures and numbness.   Psychiatric/Behavioral:  Negative for hallucinations and suicidal ideas. The patient is nervous/anxious.      Objective:     Vital Signs (Most Recent):  Temp: 98.3 °F (36.8 °C) (08/20/24 1906)  Pulse: 91 (08/20/24 1906)  Resp: (!) 26 (08/20/24 1906)  BP: (!) 163/109 (08/20/24 1906)  SpO2: 95 % (08/20/24 1906) Vital Signs (24h Range):  Temp:  [97.8 °F (36.6 °C)-98.3 °F (36.8 °C)] 98.3 °F (36.8 °C)  Pulse:  [] 91  Resp:  [16-26] 26  SpO2:  [92 %-97 %] 95 %  BP: (140-163)/() 163/109     Weight: 63.5 kg (140 lb)  Body mass index is 23.3 kg/m².     Physical Exam  Vitals reviewed.   Constitutional:       General: He is not in acute distress.     Appearance: He is well-developed.   HENT:      Head: Normocephalic and atraumatic.   Eyes:      Extraocular Movements: Extraocular movements intact.      Pupils: Pupils are equal, round, and reactive to light.   Neck:      Vascular: No JVD.      Trachea: No tracheal deviation.   Cardiovascular:      Rate and Rhythm: Normal rate and regular rhythm.      Heart sounds: No murmur heard.     No friction rub. No gallop.   Pulmonary:      Effort: No respiratory distress.       Breath sounds: Normal breath sounds. No wheezing or rales.   Abdominal:      General: Bowel sounds are normal. There is no distension.      Palpations: Abdomen is soft. There is no mass.      Tenderness: There is no abdominal tenderness.   Musculoskeletal:         General: No deformity.      Cervical back: Neck supple.   Lymphadenopathy:      Cervical: No cervical adenopathy.   Skin:     General: Skin is warm and dry.      Findings: No rash.   Neurological:      Mental Status: He is alert and oriented to person, place, and time.      Motor: Tremor present.              CRANIAL NERVES     CN III, IV, VI   Pupils are equal, round, and reactive to light.       Significant Labs: All pertinent labs within the past 24 hours have been reviewed.    Significant Imaging: I have reviewed all pertinent imaging results/findings within the past 24 hours.  Assessment/Plan:     * Alcohol withdrawal  -Pt. Reports cutting back significantly last 2 days, arrived agitated, confused, tremulous, CIWA >8  -Ethanol level 176 when patient displayed WD symptoms, making him higher risk  -Monitor on telemetry with scheduled valium taper starting at 10 mg Q6hrs. Ativan PRN ordered for CIWA >8      Psychiatric disturbance  -Pt. PEC'd at outpatient psych clinic for grave disability. Per chart review, pt. On medications depakote and zyprexa, will continue for now  -Psych consulted for assistance while admitted        VTE Risk Mitigation (From admission, onward)           Ordered     enoxaparin injection 40 mg  Every 24 hours         08/20/24 1742     IP VTE LOW RISK PATIENT  Once         08/20/24 1742     Place sequential compression device  Until discontinued         08/20/24 1742                                    Stuart Brock MD  Department of Hospital Medicine  [facility] - Emergency Dept

## 2024-08-21 NOTE — ASSESSMENT & PLAN NOTE
-Pt. Reports cutting back significantly last 2 days, arrived agitated, confused, tremulous, CIWA >8  -Ethanol level 176 when patient displayed WD symptoms, making him higher risk  -Monitor on telemetry with scheduled valium taper starting at 10 mg Q6hrs. Ativan PRN ordered for CIWA >8  - MVI, thiamine, folate  - seizure precautions

## 2024-08-21 NOTE — HOSPITAL COURSE
Mr Green was admitted to hospital medicine for alcohol withdrawal. Started on valium taper. Psychatiry consulted, PEC in place on admit which was rescinded by . Patient has remained HDS with minimal etoh withdrawal symptoms while on valium taper. He is medically cleared for discharge home and plans to attend AA following discharge. I will give rx for valium PRN x 2 days.

## 2024-08-21 NOTE — NURSING
Nurses Note -- 4 Eyes      8/21/2024   6:49 PM      Skin assessed during: Admit      [x] No Altered Skin Integrity Present    []Prevention Measures Documented      [] Yes- Altered Skin Integrity Present or Discovered   [] LDA Added if Not in Epic (Describe Wound)   [] New Altered Skin Integrity was Present on Admit and Documented in LDA   [] Wound Image Taken    Wound Care Consulted? No    Attending Nurse:  Cha Jerome RN/Staff Member:  [place]

## 2024-08-21 NOTE — ED NOTES
Pt's /111, he is tremulous and has anxiety.    Tested the right ventricular lead. See vendor printout/MD dictation.

## 2024-08-21 NOTE — ED NOTES
Patient transferred into hospital bed for increased comfort. Patient provided with pillow and blankets

## 2024-08-21 NOTE — ED NOTES
Assumed pt care, he is lying on the stretcher resting NAD noted. Pt dressed in paper scrubs, all belongings secured outside of the room. Pt room scanned for hazards, all items not in use have been removed from the room. Pt has cardiac monitoring in place. SR rate 66/min. RN outside of the room DVC maintained.

## 2024-08-21 NOTE — ED NOTES
Patient is asleep in bed in NAD, chest rise and fall noted. VSS. Patient remains in paper scrubs with all harmful objects outside of room.  remains at bedside with direct visual contact. Bed locked in lowest position with side rails raised x2.

## 2024-08-21 NOTE — SUBJECTIVE & OBJECTIVE
Past Medical History:   Diagnosis Date    Alcohol abuse     Hepatitis C        Past Surgical History:   Procedure Laterality Date    OTHER SURGICAL HISTORY      right arm surgery    ROBOT-ASSISTED LAPAROSCOPIC REPAIR OF INGUINAL HERNIA Right 6/7/2021    Procedure: ROBOTIC REPAIR, HERNIA, INGUINAL (right possible left);  Surgeon: Los Perry MD;  Location: [facility];  Service: General;  Laterality: Right;    TONSILLECTOMY         Review of patient's allergies indicates:   Allergen Reactions    Penicillins Rash       No current facility-administered medications on file prior to encounter.     Current Outpatient Medications on File Prior to Encounter   Medication Sig    divalproex (DEPAKOTE) 500 MG TbEC Take 500 mg by mouth every evening.    OLANZapine (ZYPREXA) 15 MG Tab Take 15 mg by mouth every evening.    propranoloL (INDERAL) 10 MG tablet Take 10 mg by mouth 2 (two) times daily as needed.    VITAMIN B-1, MONONITRATE, 100 mg Tab Take 100 mg by mouth once daily.    chlordiazepoxide (LIBRIUM) 25 MG Cap Take 2 capsules (50 mg total) by mouth 4 (four) times daily for 1 day, THEN 2 capsules (50 mg total) 3 (three) times daily for 1 day, THEN 1 capsule (25 mg total) 4 (four) times daily for 1 day, THEN 1 capsule (25 mg total) 3 (three) times daily for 1 day, THEN 1 capsule (25 mg total) 2 (two) times a day for 1 day, THEN 1 capsule (25 mg total) every evening for 1 day.    ibuprofen (ADVIL,MOTRIN) 600 MG tablet Take 600 mg by mouth once daily.    mupirocin (BACTROBAN) 2 % ointment Apply topically 3 (three) times daily. (Patient not taking: Reported on 1/29/2024)     Family History    None       Tobacco Use    Smoking status: Every Day     Current packs/day: 1.00     Types: Cigarettes    Smokeless tobacco: Never   Substance and Sexual Activity    Alcohol use: Yes     Alcohol/week: 24.0 standard drinks of alcohol     Types: 24 Cans of beer per week     Comment: whiskey daily    Drug use: No     Comment: former pill  abuser; denies IVDA    Sexual activity: Not Currently     Partners: Female     Review of Systems   Constitutional:  Positive for diaphoresis. Negative for activity change, chills, fever and unexpected weight change.   HENT:  Negative for congestion and sore throat.    Respiratory:  Negative for cough, shortness of breath and wheezing.    Cardiovascular:  Negative for chest pain, palpitations and leg swelling.   Gastrointestinal:  Negative for abdominal pain, blood in stool, nausea and vomiting.   Genitourinary:  Negative for dysuria and hematuria.   Musculoskeletal:  Negative for arthralgias and neck pain.   Skin:  Negative for color change and rash.   Neurological:  Positive for tremors. Negative for dizziness, seizures and numbness.   Psychiatric/Behavioral:  Negative for hallucinations and suicidal ideas. The patient is nervous/anxious.      Objective:     Vital Signs (Most Recent):  Temp: 98.3 °F (36.8 °C) (08/20/24 1906)  Pulse: 91 (08/20/24 1906)  Resp: (!) 26 (08/20/24 1906)  BP: (!) 163/109 (08/20/24 1906)  SpO2: 95 % (08/20/24 1906) Vital Signs (24h Range):  Temp:  [97.8 °F (36.6 °C)-98.3 °F (36.8 °C)] 98.3 °F (36.8 °C)  Pulse:  [] 91  Resp:  [16-26] 26  SpO2:  [92 %-97 %] 95 %  BP: (140-163)/() 163/109     Weight: 63.5 kg (140 lb)  Body mass index is 23.3 kg/m².     Physical Exam  Vitals reviewed.   Constitutional:       General: He is not in acute distress.     Appearance: He is well-developed.   HENT:      Head: Normocephalic and atraumatic.   Eyes:      Extraocular Movements: Extraocular movements intact.      Pupils: Pupils are equal, round, and reactive to light.   Neck:      Vascular: No JVD.      Trachea: No tracheal deviation.   Cardiovascular:      Rate and Rhythm: Normal rate and regular rhythm.      Heart sounds: No murmur heard.     No friction rub. No gallop.   Pulmonary:      Effort: No respiratory distress.      Breath sounds: Normal breath sounds. No wheezing or rales.    Abdominal:      General: Bowel sounds are normal. There is no distension.      Palpations: Abdomen is soft. There is no mass.      Tenderness: There is no abdominal tenderness.   Musculoskeletal:         General: No deformity.      Cervical back: Neck supple.   Lymphadenopathy:      Cervical: No cervical adenopathy.   Skin:     General: Skin is warm and dry.      Findings: No rash.   Neurological:      Mental Status: He is alert and oriented to person, place, and time.      Motor: Tremor present.              CRANIAL NERVES     CN III, IV, VI   Pupils are equal, round, and reactive to light.       Significant Labs: All pertinent labs within the past 24 hours have been reviewed.    Significant Imaging: I have reviewed all pertinent imaging results/findings within the past 24 hours.

## 2024-08-22 LAB
ALBUMIN SERPL BCP-MCNC: 3.1 G/DL (ref 3.5–5.2)
ALP SERPL-CCNC: 41 U/L (ref 55–135)
ALT SERPL W/O P-5'-P-CCNC: 80 U/L (ref 10–44)
ANION GAP SERPL CALC-SCNC: 9 MMOL/L (ref 8–16)
AST SERPL-CCNC: 75 U/L (ref 10–40)
BASOPHILS # BLD AUTO: 0.05 K/UL (ref 0–0.2)
BASOPHILS NFR BLD: 0.8 % (ref 0–1.9)
BILIRUB SERPL-MCNC: 2.4 MG/DL (ref 0.1–1)
BUN SERPL-MCNC: 9 MG/DL (ref 6–20)
CALCIUM SERPL-MCNC: 8.6 MG/DL (ref 8.7–10.5)
CHLORIDE SERPL-SCNC: 105 MMOL/L (ref 95–110)
CO2 SERPL-SCNC: 24 MMOL/L (ref 23–29)
CREAT SERPL-MCNC: 0.7 MG/DL (ref 0.5–1.4)
DIFFERENTIAL METHOD BLD: ABNORMAL
EOSINOPHIL # BLD AUTO: 0.2 K/UL (ref 0–0.5)
EOSINOPHIL NFR BLD: 3.1 % (ref 0–8)
ERYTHROCYTE [DISTWIDTH] IN BLOOD BY AUTOMATED COUNT: 12 % (ref 11.5–14.5)
EST. GFR  (NO RACE VARIABLE): >60 ML/MIN/1.73 M^2
GLUCOSE SERPL-MCNC: 95 MG/DL (ref 70–110)
HCT VFR BLD AUTO: 44.7 % (ref 40–54)
HGB BLD-MCNC: 16.1 G/DL (ref 14–18)
IMM GRANULOCYTES # BLD AUTO: 0.03 K/UL (ref 0–0.04)
IMM GRANULOCYTES NFR BLD AUTO: 0.5 % (ref 0–0.5)
LYMPHOCYTES # BLD AUTO: 2.1 K/UL (ref 1–4.8)
LYMPHOCYTES NFR BLD: 34.8 % (ref 18–48)
MCH RBC QN AUTO: 34.8 PG (ref 27–31)
MCHC RBC AUTO-ENTMCNC: 36 G/DL (ref 32–36)
MCV RBC AUTO: 97 FL (ref 82–98)
MONOCYTES # BLD AUTO: 0.8 K/UL (ref 0.3–1)
MONOCYTES NFR BLD: 13.1 % (ref 4–15)
NEUTROPHILS # BLD AUTO: 2.8 K/UL (ref 1.8–7.7)
NEUTROPHILS NFR BLD: 47.7 % (ref 38–73)
NRBC BLD-RTO: 0 /100 WBC
PLATELET # BLD AUTO: 122 K/UL (ref 150–450)
PMV BLD AUTO: 11.1 FL (ref 9.2–12.9)
POTASSIUM SERPL-SCNC: 3.9 MMOL/L (ref 3.5–5.1)
PROT SERPL-MCNC: 6.7 G/DL (ref 6–8.4)
RBC # BLD AUTO: 4.62 M/UL (ref 4.6–6.2)
SODIUM SERPL-SCNC: 138 MMOL/L (ref 136–145)
WBC # BLD AUTO: 5.89 K/UL (ref 3.9–12.7)

## 2024-08-22 PROCEDURE — 63600175 PHARM REV CODE 636 W HCPCS: Performed by: HOSPITALIST

## 2024-08-22 PROCEDURE — S4991 NICOTINE PATCH NONLEGEND: HCPCS

## 2024-08-22 PROCEDURE — 99233 SBSQ HOSP IP/OBS HIGH 50: CPT | Mod: AF,HB,, | Performed by: PSYCHIATRY & NEUROLOGY

## 2024-08-22 PROCEDURE — 36415 COLL VENOUS BLD VENIPUNCTURE: CPT | Performed by: HOSPITALIST

## 2024-08-22 PROCEDURE — 25000003 PHARM REV CODE 250

## 2024-08-22 PROCEDURE — 25000003 PHARM REV CODE 250: Performed by: HOSPITALIST

## 2024-08-22 PROCEDURE — 85025 COMPLETE CBC W/AUTO DIFF WBC: CPT | Performed by: HOSPITALIST

## 2024-08-22 PROCEDURE — 80053 COMPREHEN METABOLIC PANEL: CPT | Performed by: HOSPITALIST

## 2024-08-22 PROCEDURE — 20600001 HC STEP DOWN PRIVATE ROOM

## 2024-08-22 RX ADMIN — PROPRANOLOL HYDROCHLORIDE 10 MG: 10 TABLET ORAL at 08:08

## 2024-08-22 RX ADMIN — DIAZEPAM 10 MG: 5 TABLET ORAL at 01:08

## 2024-08-22 RX ADMIN — CLONIDINE HYDROCHLORIDE 0.1 MG: 0.1 TABLET ORAL at 09:08

## 2024-08-22 RX ADMIN — NICOTINE 1 PATCH: 14 PATCH, EXTENDED RELEASE TRANSDERMAL at 08:08

## 2024-08-22 RX ADMIN — THERA TABS 1 TABLET: TAB at 08:08

## 2024-08-22 RX ADMIN — OLANZAPINE 10 MG: 5 TABLET, ORALLY DISINTEGRATING ORAL at 08:08

## 2024-08-22 RX ADMIN — Medication 100 MG: at 08:08

## 2024-08-22 RX ADMIN — DIAZEPAM 5 MG: 5 TABLET ORAL at 05:08

## 2024-08-22 RX ADMIN — FOLIC ACID 1 MG: 1 TABLET ORAL at 09:08

## 2024-08-22 RX ADMIN — DIVALPROEX SODIUM 250 MG: 250 TABLET, DELAYED RELEASE ORAL at 08:08

## 2024-08-22 RX ADMIN — DIAZEPAM 10 MG: 5 TABLET ORAL at 09:08

## 2024-08-22 RX ADMIN — ENOXAPARIN SODIUM 40 MG: 40 INJECTION SUBCUTANEOUS at 05:08

## 2024-08-22 NOTE — ASSESSMENT & PLAN NOTE
-Pt. Reports cutting back significantly last 2 days, arrived agitated, confused, tremulous, CIWA >8  -Ethanol level 176 when patient displayed WD symptoms, making him higher risk  -Monitor on telemetry with scheduled valium taper starting at 10 mg Q6hrs. Ativan PRN ordered for CIWA >8  - MVI, thiamine, folate  - seizure precautions  - cont valium taper

## 2024-08-22 NOTE — SUBJECTIVE & OBJECTIVE
Interval History: NAEON. PEC has been rescinded by . VSS. Noted to have mild b/l hand tremulousness. Denies anxiety or hallucinations.    Review of Systems   Constitutional:  Negative for activity change, chills, diaphoresis, fever and unexpected weight change.   HENT:  Negative for congestion and sore throat.    Respiratory:  Negative for cough, shortness of breath and wheezing.    Cardiovascular:  Negative for chest pain, palpitations and leg swelling.   Gastrointestinal:  Negative for abdominal pain, blood in stool, nausea and vomiting.   Genitourinary:  Negative for dysuria and hematuria.   Musculoskeletal:  Negative for arthralgias and neck pain.   Skin:  Negative for color change and rash.   Neurological:  Positive for tremors. Negative for dizziness, seizures and numbness.   Psychiatric/Behavioral:  Negative for hallucinations and suicidal ideas. The patient is not nervous/anxious.      Objective:     Vital Signs (Most Recent):  Temp: 97.6 °F (36.4 °C) (08/22/24 1159)  Pulse: 75 (08/22/24 1159)  Resp: 18 (08/22/24 1159)  BP: (!) 134/93 (08/22/24 1159)  SpO2: 97 % (08/22/24 1159) Vital Signs (24h Range):  Temp:  [97.6 °F (36.4 °C)-98.5 °F (36.9 °C)] 97.6 °F (36.4 °C)  Pulse:  [69-80] 75  Resp:  [17-20] 18  SpO2:  [94 %-98 %] 97 %  BP: (130-166)/(86-99) 134/93     Weight: 63.5 kg (140 lb)  Body mass index is 23.3 kg/m².    Intake/Output Summary (Last 24 hours) at 8/22/2024 1557  Last data filed at 8/22/2024 1458  Gross per 24 hour   Intake 402 ml   Output 5 ml   Net 397 ml         Physical Exam  Vitals reviewed.   Constitutional:       General: He is not in acute distress.     Appearance: He is well-developed.   HENT:      Head: Normocephalic and atraumatic.   Eyes:      Extraocular Movements: Extraocular movements intact.      Pupils: Pupils are equal, round, and reactive to light.   Neck:      Vascular: No JVD.      Trachea: No tracheal deviation.   Cardiovascular:      Rate and Rhythm: Normal rate and  regular rhythm.      Heart sounds: No murmur heard.     No friction rub. No gallop.   Pulmonary:      Effort: No respiratory distress.      Breath sounds: Normal breath sounds. No wheezing or rales.   Abdominal:      General: Bowel sounds are normal. There is no distension.      Palpations: Abdomen is soft. There is no mass.      Tenderness: There is no abdominal tenderness.   Musculoskeletal:         General: No deformity.      Cervical back: Neck supple.   Lymphadenopathy:      Cervical: No cervical adenopathy.   Skin:     General: Skin is warm and dry.      Findings: No rash.   Neurological:      Mental Status: He is alert and oriented to person, place, and time.      Motor: Tremor present.             Significant Labs: All pertinent labs within the past 24 hours have been reviewed.  CBC:   Recent Labs   Lab 08/21/24  0333 08/22/24  0712   WBC 4.24 5.89   HGB 13.3* 16.1   HCT 39.1* 44.7   PLT 87* 122*     CMP:   Recent Labs   Lab 08/21/24  0333 08/22/24  0712    138   K 3.8 3.9    105   CO2 21* 24   GLU 86 95   BUN 8 9   CREATININE 0.7 0.7   CALCIUM 8.1* 8.6*   PROT 6.2 6.7   ALBUMIN 3.0* 3.1*   BILITOT 1.4* 2.4*   ALKPHOS 44* 41*   AST 83* 75*   ALT 85* 80*   ANIONGAP 9 9       Significant Imaging: I have reviewed all pertinent imaging results/findings within the past 24 hours.

## 2024-08-22 NOTE — PLAN OF CARE
[facility] - Telemetry Stepdown  Initial Discharge Assessment       Primary Care Provider: Agustin Farnsworth MD    Admission Diagnosis: Cough [R05.9]  Alcohol abuse [F10.10]  Agitation [R45.1]  Tachycardia [R00.0]  Alcohol withdrawal syndrome with complication [F10.939]    Admission Date: 8/20/2024  Expected Discharge Date: 8/23/2024    Transition of Care Barriers: (P) None    Payor: MEDICAID / Plan: HEALTHY BLUE [place] / Product Type: Managed Medicaid /     Extended Emergency Contact Information  Primary Emergency Contact: Kayla Ulloa  Address: [address]           [place] [address] [place]  Mobile Phone: [phone]  Relation: Sister  Secondary Emergency Contact: Goyo Green  Address: [address] [place] [address] [place] [address] [place]  Mobile Phone: [phone]  Relation: Son    Discharge Plan A: (P) Home with family  Discharge Plan B: (P) Home      [id number] - [place] - [address] AT [facility] [address]  [address] [place] [address]  Phone: [phone] Fax: [phone]        CM met with patient to discuss discharge planning. Patient states that he lives with Goyo Green (son) [phone]  and his son's family in a single story home with no steps to enter. Prior to hospital admission, patient was independent with ADLs and did not require medical equipment. Discharge plan is to return home with family. Once medically ready, patient's son Goyo will provide transportation home. Discharge Plan A and Plan B have been determined by review of patient's clinical status, future medical and therapeutic needs, and coverage/benefits for post-acute care in coordination with multidisciplinary team members.  Initial Assessment (most recent)       Adult Discharge Assessment - 08/22/24 6347          Discharge Assessment    Assessment Type Discharge Planning Assessment (P)      Confirmed/corrected  address, phone number and insurance Yes (P)      Confirmed Demographics Correct on Facesheet (P)      Source of Information patient (P)      Does patient/caregiver understand observation status Yes (P)      Communicated TIA with patient/caregiver Yes (P)      Reason For Admission Alcohol withdrawal (P)      People in Home child(patrick), adult (P)      Facility Arrived From: home (P)      Do you expect to return to your current living situation? Yes (P)      Do you have help at home or someone to help you manage your care at home? Yes (P)      Who are your caregiver(s) and their phone number(s)? Goyo Green (son) [phone] (P)      Prior to hospitilization cognitive status: Alert/Oriented (P)      Current cognitive status: Alert/Oriented (P)      Walking or Climbing Stairs Difficulty no (P)      Dressing/Bathing Difficulty no (P)      Home Layout Able to live on 1st floor (P)      Equipment Currently Used at Home none (P)      Readmission within 30 days? No (P)      Patient currently being followed by outpatient case management? No (P)      Do you currently have service(s) that help you manage your care at home? No (P)      Do you take prescription medications? Yes (P)      Do you have prescription coverage? Yes (P)      Coverage Medicaid (P)      Do you have any problems affording any of your prescribed medications? No (P)      Is the patient taking medications as prescribed? yes (P)      Who is going to help you get home at discharge? Goyo Green (son) [phone] (P)      How do you get to doctors appointments? family or friend will provide;public transportation (P)      Are you on dialysis? No (P)      Do you take coumadin? No (P)      Discharge Plan A Home with family (P)      Discharge Plan B Home (P)      DME Needed Upon Discharge  none (P)      Discharge Plan discussed with: Patient (P)      Transition of Care Barriers None (P)         Physical Activity    On average, how many days per week do you  engage in moderate to strenuous exercise (like a brisk walk)? 2 days (P)      On average, how many minutes do you engage in exercise at this level? 60 min (P)         Financial Resource Strain    How hard is it for you to pay for the very basics like food, housing, medical care, and heating? Not very hard (P)         Housing Stability    In the last 12 months, was there a time when you were not able to pay the mortgage or rent on time? No (P)      At any time in the past 12 months, were you homeless or living in a shelter (including now)? No (P)         Transportation Needs    Has the lack of transportation kept you from medical appointments, meetings, work or from getting things needed for daily living? No (P)         Food Insecurity    Within the past 12 months, you worried that your food would run out before you got the money to buy more. Never true (P)      Within the past 12 months, the food you bought just didn't last and you didn't have money to get more. Never true (P)         Stress    Do you feel stress - tense, restless, nervous, or anxious, or unable to sleep at night because your mind is troubled all the time - these days? Only a little (P)         Social Isolation    How often do you feel lonely or isolated from those around you?  Rarely (P)         Alcohol Use    Q1: How often do you have a drink containing alcohol? Patient declined (P)      Q2: How many drinks containing alcohol do you have on a typical day when you are drinking? Patient declined (P)      Q3: How often do you have six or more drinks on one occasion? Patient declined (P)         Fund Recsities    In the past 12 months has the electric, gas, oil, or water company threatened to shut off services in your home? No (P)         Health Literacy    How often do you need to have someone help you when you read instructions, pamphlets, or other written material from your doctor or pharmacy? Sometimes (P)         OTHER    Name(s) of People in Home  Goyo Green (son) [phone] (P)                         Oneal Boone RNCM  Case Management  [phone]

## 2024-08-22 NOTE — ASSESSMENT & PLAN NOTE
-Pt. PEC'd at outpatient psych clinic for grave disability. Per chart review, pt. On medications depakote and zyprexa, will continue for now  -PEC rescinded  -Psych consulted for assistance while admitted  -PEC rescinded by

## 2024-08-22 NOTE — PROGRESS NOTES
[facility] - Telemetry [facility] Medicine  Progress Note    Patient Name: Armando Green  MRN: [id number]  Patient Class: IP- Inpatient   Admission Date: 8/20/2024  Length of Stay: 2 days  Attending Physician: Ed Dominguez MD  Primary Care Provider: Agustin Farnsworth MD        Subjective:     Principal Problem:Alcohol withdrawal        HPI:  60 yo M with PMHx alcohol abuse who presents to the ED with police from [facility] for alcohol withdrawal concerns and delirium. Pt. Reports was taken to psych appointment by son today, but the staff at the clinic noted pt. Delirious, sweating, tremulous. He reportedly started becoming aggressive and was unable to be reasoned with. Provider recommended he be treated for alcohol withdrawal after obtaining collateral from son. He was placed under PEC and police were called and brought patient to the ED. At time of my assessment, pt. Angry about being in the ED, but he denies any SI or HI. He does not display psychotic behavior and appears alert and oriented x 4, but sleepy after being given IV ativan.    Per notes from [facility] today: Since his last visit, he has been tying to detox by reducing his alcohol intake.he wakes up daily with tremors and shakes, has been vomiting every morning, very anxious, has to drink as soon as he gets up. He has been missing days of workUsual estimated consumption estimated to be just over 1/5 daily. He has been drinking for many years, has never been to rehab. He has never had hospitalization from withdrawals, no reported seizures.    Psych hx, previously hospitalized in for 10 days at [place] in early 2016 for paranoid delusions, was put on Zyprexa and Depakote. He has been on Zyprexa and Depakote since then.      Overview/Hospital Course:  Mr Green was admitted to hospital medicine for alcohol withdrawal. Started on valium taper. Psychatiry consulted, PEC in place on admit.     Interval History: TOMION. PEC  has been rescinded by . VSS. Noted to have mild b/l hand tremulousness. Denies anxiety or hallucinations.    Review of Systems   Constitutional:  Negative for activity change, chills, diaphoresis, fever and unexpected weight change.   HENT:  Negative for congestion and sore throat.    Respiratory:  Negative for cough, shortness of breath and wheezing.    Cardiovascular:  Negative for chest pain, palpitations and leg swelling.   Gastrointestinal:  Negative for abdominal pain, blood in stool, nausea and vomiting.   Genitourinary:  Negative for dysuria and hematuria.   Musculoskeletal:  Negative for arthralgias and neck pain.   Skin:  Negative for color change and rash.   Neurological:  Positive for tremors. Negative for dizziness, seizures and numbness.   Psychiatric/Behavioral:  Negative for hallucinations and suicidal ideas. The patient is not nervous/anxious.      Objective:     Vital Signs (Most Recent):  Temp: 97.6 °F (36.4 °C) (08/22/24 1159)  Pulse: 75 (08/22/24 1159)  Resp: 18 (08/22/24 1159)  BP: (!) 134/93 (08/22/24 1159)  SpO2: 97 % (08/22/24 1159) Vital Signs (24h Range):  Temp:  [97.6 °F (36.4 °C)-98.5 °F (36.9 °C)] 97.6 °F (36.4 °C)  Pulse:  [69-80] 75  Resp:  [17-20] 18  SpO2:  [94 %-98 %] 97 %  BP: (130-166)/(86-99) 134/93     Weight: 63.5 kg (140 lb)  Body mass index is 23.3 kg/m².    Intake/Output Summary (Last 24 hours) at 8/22/2024 1557  Last data filed at 8/22/2024 1458  Gross per 24 hour   Intake 402 ml   Output 5 ml   Net 397 ml         Physical Exam  Vitals reviewed.   Constitutional:       General: He is not in acute distress.     Appearance: He is well-developed.   HENT:      Head: Normocephalic and atraumatic.   Eyes:      Extraocular Movements: Extraocular movements intact.      Pupils: Pupils are equal, round, and reactive to light.   Neck:      Vascular: No JVD.      Trachea: No tracheal deviation.   Cardiovascular:      Rate and Rhythm: Normal rate and regular rhythm.      Heart  sounds: No murmur heard.     No friction rub. No gallop.   Pulmonary:      Effort: No respiratory distress.      Breath sounds: Normal breath sounds. No wheezing or rales.   Abdominal:      General: Bowel sounds are normal. There is no distension.      Palpations: Abdomen is soft. There is no mass.      Tenderness: There is no abdominal tenderness.   Musculoskeletal:         General: No deformity.      Cervical back: Neck supple.   Lymphadenopathy:      Cervical: No cervical adenopathy.   Skin:     General: Skin is warm and dry.      Findings: No rash.   Neurological:      Mental Status: He is alert and oriented to person, place, and time.      Motor: Tremor present.             Significant Labs: All pertinent labs within the past 24 hours have been reviewed.  CBC:   Recent Labs   Lab 08/21/24  0333 08/22/24  0712   WBC 4.24 5.89   HGB 13.3* 16.1   HCT 39.1* 44.7   PLT 87* 122*     CMP:   Recent Labs   Lab 08/21/24  0333 08/22/24  0712    138   K 3.8 3.9    105   CO2 21* 24   GLU 86 95   BUN 8 9   CREATININE 0.7 0.7   CALCIUM 8.1* 8.6*   PROT 6.2 6.7   ALBUMIN 3.0* 3.1*   BILITOT 1.4* 2.4*   ALKPHOS 44* 41*   AST 83* 75*   ALT 85* 80*   ANIONGAP 9 9       Significant Imaging: I have reviewed all pertinent imaging results/findings within the past 24 hours.    Assessment/Plan:      * Alcohol withdrawal  -Pt. Reports cutting back significantly last 2 days, arrived agitated, confused, tremulous, CIWA >8  -Ethanol level 176 when patient displayed WD symptoms, making him higher risk  -Monitor on telemetry with scheduled valium taper starting at 10 mg Q6hrs. Ativan PRN ordered for CIWA >8  - MVI, thiamine, folate  - seizure precautions  - cont valium taper    Transaminitis  Suspect elevated 2/2 alcohol use  Denies abdominal pain  Monitor with daily labs    Psychiatric disturbance  -Pt. PEC'd at outpatient psych clinic for grave disability. Per chart review, pt. On medications depakote and zyprexa, will continue  for now  -PEC rescinded  -Psych consulted for assistance while admitted  -PEC rescinded by         VTE Risk Mitigation (From admission, onward)           Ordered     enoxaparin injection 40 mg  Every 24 hours         08/20/24 1742     IP VTE LOW RISK PATIENT  Once         08/20/24 1742     Place sequential compression device  Until discontinued         08/20/24 1742                    Discharge Planning   TIA: 8/23/2024     Code Status: Full Code   Is the patient medically ready for discharge?:     Reason for patient still in hospital (select all that apply): Treatment                     Ed Dominguez MD  Department of Hospital Medicine   [facility] - Telemetry Stepdown

## 2024-08-22 NOTE — PROGRESS NOTES
"CONSULTATION LIAISON PSYCHIATRY PROGRESS NOTE    Patient Name: Armando Green  MRN: [id number]  Patient Class: IP- Inpatient  Admission Date: 8/20/2024  Attending Physician: dE Dominguez MD      SUBJECTIVE:   Armando Green is a 59 y.o. male with past psychiatric history of alcohol use disorder, schizophrenia vs SIPD & past pertinent medical history of hepatitis C presents to the ED/admitted to the hospital for Alcohol withdrawal     Psychiatry consulted for "PEC for grave disability and alcohol intoxication and withdrawal symptoms"     Today, patient seen resting in bed comfortably. Calm and cooperative throughout exam. States he feels "much better" today, slept well overnight and denies any symptoms of acute withdrawal. States he is "done drinking". Offered to assist pt in arranging stay at rehab or IOP upon discharge; pt denied current interest but would appreciate information placed in his discharge summary. States he is aware of AA meetings in his area. Would like to continue care with his outpatient psychiatrist and has no current concerns about his regimen. Plans to continue smoking cigarettes and is not interested in exploring cessation currently. Is focused on returning to his job. Counseled pt on risk of complicated withdrawal as well as risk of consuming alcohol and benzodiazepines concomitantly; patient aware and firmly states he will not do so and is confident in his ability to maintain sobriety. Denies SI/HI/AVH. No acute concerns or questions for provider.        OBJECTIVE:    Mental Status Exam:  General Appearance: unremarkable, appears stated age  Behavior: normal, cooperative  Involuntary Movements and Motor Activity: no abnormal involuntary movements noted; no tics, no tremors, no akathisia, no dystonia, no evidence of tardive dyskinesia; no psychomotor agitation or retardation  Gait and Station: unable to assess - patient lying down or seated  Speech and Language: intact; normal rate, " "rhythm, volume, tone, and pitch; conversational, spontaneous, and coherent; speaks and understands English proficiently and fluently; repeats words and phrases, no word finding difficulties are noted  Mood: "doing better"  Affect: euthymic, appropriate to situation and context  Thought Process and Associations: intact; linear, goal-directed, organized, and logical; no loosening of associations noted  Thought Content and Perceptions:: no suicidal or homicidal ideation, no auditory or visual hallucinations, no paranoid ideation, no ideas of reference, no evidence of delusions or psychosis  Sensorium and Orientation: grossly intact  Recent and Remote Memory: grossly intact, able to recall relevant and salient information from the recent and remote past  Attention and Concentration: grossly intact, attentive to the conversation and not readily distractible  Fund of Knowledge: grossly intact, used appropriate vocabulary and demonstrated an awareness of current events, consistent with educational level achieved  Insight: intact, demonstrates awareness of illness and situation  Judgment: behavior is adequate/appropriate to circumstances    CAM ICU positive? no      ASSESSMENT & RECOMMENDATIONS   Alcohol Withdrawal, improving  Alcohol Use Disorder - severe  Hx of schizophrenia vs SIPD     Complicated Alcohol Withdrawal  Alcohol withdrawal usually begins within 6-8 hours after an abrupt reduction in alcohol intake but may not manifest for up to 72 hours. Withdrawal generally peaks within 10-30 hours and lasts for 3-7 days.  LEANNE <10 on admit. Positive history of alcohol withdrawal seizures. Agree with valium taper.  Valium taper starting at 10mg Q6H. Goal is to decrease Valium 5-10 mg daily as tolerated by the patient's VS.  Clinical Callaway Withdrawal Assessment of Alcohol Scale (CIWA-Ar)  If CIWA is <8 and vital signs are stable, no PRN medication is required. Repeat vital signs q4 and the CIWA q8.  If CIWA is between 8 " and 15, give Lorazepam 2 mg PO/IM q4 PRN and complete vital signs q2 and the CIWA q4.  If CIWA is ?15 or DBP ?110 mmHg, give Lorazepam 2 mg PO/IM q1 until patient has a CIWA of <15 or DBP <110 mmHg. CIWA and vital signs checked q1 until patients CIWA is <15 and DBP <110 mmHg  Call MD for for SBP >180, DBP >120, or HR >110 or if patient requires ?6 mg of lorazepam in 3 hours.  Recommended lab work includes CBC, CMP, liver panel (prior to initiating taper), B12, folate, iron panel, thiamine level.  Agree with IV thiamine supplementation. Supplement Folate 1 mg daily, MVI daily.  Seizure precautions in place. Seizures generally occur between 12-48 hours after alcohol cessation. Monitor for hypoglycemia, hypocalcemia, and hypomagnesemia as these can predispose to seizure.  Monitor for AMS, ataxia and nystagmus as these can be signs of Wernicke's Encephalopathy.     Reported Hx of Schizophrenia vs SIPD  Can continue outpatient regimen of zyprexa 10mg nightly  Can continue outpatient regimen of depakote 250mg nightly      RISK ASSESSMENT  PEC rescinded by  this AM     FOLLOW UP  Will sign off. Patient can follow up with previous outpatient mental health provider. Resources provided in patient's discharge instructions. Not currently interested in rehab placement but willing to receive resources for such.      DISPOSITION - once medically cleared:    Defer to medical team    Please contact ON CALL psychiatry service (24/7) for any acute issues that may arise.    Dr. Cristal Diaz   Psychiatry  [facility]  8/22/2024 9:32 AM        --------------------------------------------------------------------------------------------------------------------------------------------------------------------------------------------------------------------------------------    CONTINUED OBJECTIVE clinical data & findings reviewed and noted for above decision making    Current Medications:   Scheduled Meds:     diazePAM  5 mg Oral Q6H    Followed by    [START ON 8/23/2024] diazePAM  5 mg Oral Q8H    divalproex  250 mg Oral QHS    enoxparin  40 mg Subcutaneous Q24H (prophylaxis, 1700)    folic acid  1 mg Oral Daily    multivitamin  1 tablet Oral Daily    nicotine  1 patch Transdermal Daily    OLANZapine zydis  10 mg Oral QHS    propranoloL  10 mg Oral BID    thiamine  100 mg Oral Daily     PRN Meds:   Current Facility-Administered Medications:     acetaminophen, 650 mg, Oral, Q6H PRN    cloNIDine, 0.1 mg, Oral, Q4H PRN    dextrose 10%, 12.5 g, Intravenous, PRN    dextrose 10%, 25 g, Intravenous, PRN    glucagon (human recombinant), 1 mg, Intramuscular, PRN    glucose, 16 g, Oral, PRN    glucose, 24 g, Oral, PRN    LORazepam, 2 mg, Oral, Q4H PRN    melatonin, 6 mg, Oral, Nightly PRN    naloxone, 0.02 mg, Intravenous, PRN    ondansetron, 4 mg, Intravenous, Q8H PRN    prochlorperazine, 5 mg, Intravenous, Q6H PRN    sodium chloride 0.9%, 10 mL, Intravenous, PRN    Allergies:   Review of patient's allergies indicates:   Allergen Reactions    Penicillins Rash       Vitals  Vitals:    08/22/24 0807   BP: (!) 166/99   Pulse: 69   Resp: 18   Temp: 98.5 °F (36.9 °C)       Labs/Imaging/Studies:  Recent Results (from the past 24 hour(s))   Ethanol, urine    Collection Time: 08/21/24 11:04 AM   Result Value Ref Range    Alcohol, Urine <10 <10 mg/dL   Comprehensive Metabolic Panel (CMP)    Collection Time: 08/22/24  7:12 AM   Result Value Ref Range    Sodium 138 136 - 145 mmol/L    Potassium 3.9 3.5 - 5.1 mmol/L    Chloride 105 95 - 110 mmol/L    CO2 24 23 - 29 mmol/L    Glucose 95 70 - 110 mg/dL    BUN 9 6 - 20 mg/dL    Creatinine 0.7 0.5 - 1.4 mg/dL    Calcium 8.6 (L) 8.7 - 10.5 mg/dL    Total Protein 6.7 6.0 - 8.4 g/dL    Albumin 3.1 (L) 3.5 - 5.2 g/dL    Total Bilirubin 2.4 (H) 0.1 - 1.0 mg/dL    Alkaline Phosphatase 41 (L) 55 - 135 U/L    AST 75 (H) 10 - 40 U/L    ALT 80 (H) 10 - 44 U/L    eGFR >60.0 >60 mL/min/1.73 m^2    Anion Gap  9 8 - 16 mmol/L   CBC with Automated Differential    Collection Time: 08/22/24  7:12 AM   Result Value Ref Range    WBC 5.89 3.90 - 12.70 K/uL    RBC 4.62 4.60 - 6.20 M/uL    Hemoglobin 16.1 14.0 - 18.0 g/dL    Hematocrit 44.7 40.0 - 54.0 %    MCV 97 82 - 98 fL    MCH 34.8 (H) 27.0 - 31.0 pg    MCHC 36.0 32.0 - 36.0 g/dL    RDW 12.0 11.5 - 14.5 %    Platelets 122 (L) 150 - 450 K/uL    MPV 11.1 9.2 - 12.9 fL    Immature Granulocytes 0.5 0.0 - 0.5 %    Gran # (ANC) 2.8 1.8 - 7.7 K/uL    Immature Grans (Abs) 0.03 0.00 - 0.04 K/uL    Lymph # 2.1 1.0 - 4.8 K/uL    Mono # 0.8 0.3 - 1.0 K/uL    Eos # 0.2 0.0 - 0.5 K/uL    Baso # 0.05 0.00 - 0.20 K/uL    nRBC 0 0 /100 WBC    Gran % 47.7 38.0 - 73.0 %    Lymph % 34.8 18.0 - 48.0 %    Mono % 13.1 4.0 - 15.0 %    Eosinophil % 3.1 0.0 - 8.0 %    Basophil % 0.8 0.0 - 1.9 %    Differential Method Automated      Imaging Results              X-Ray Chest AP Portable (Final result)  Result time 08/20/24 21:03:07      Final result by Damon Wiley MD (08/20/24 21:03:07)                   Impression:      No radiographic acute intrathoracic process seen on this single view.      Electronically signed by: Damon Wiley MD  Date:    08/20/2024  Time:    21:03               Narrative:    EXAMINATION:  XR CHEST AP PORTABLE    CLINICAL HISTORY:  Tacycardia;    TECHNIQUE:  Single frontal view of the chest was performed.    COMPARISON:  Chest radiograph 03/14/2018    FINDINGS:  Monitoring leads overlie the chest.  Patient is slightly rotated.    No detrimental change.Few scattered linear opacities throughout the lungs consistent with minimal platelike scarring versus atelectasis.  The lungs are otherwise well expanded without consolidation, pleural effusion or pneumothorax.    Cardiomediastinal silhouette is midline and within normal limits for age, stable.  Pulmonary vasculature and hilar contours are within normal limits.    No acute osseous process seen.  PA and lateral views can be  obtained.

## 2024-08-22 NOTE — DISCHARGE INSTRUCTIONS
REFERRAL RECOMMENDATIONS FOR ALCOHOL USE DISORDER      12 STEP PROGRAMS (and similar):     Alcoholics Anonymous (local)  [x] [phone]  [x] www.aaneworleans.org for schedules for in-person and online meetings  [x] There are AA meetings throughout the day all over town  [x] AA costs nothing to attend; they pass a basket for donations but this is not required    Alcoholics Anonymous Online Intergroup (national)  [x] www.aa-intergroup.org  [x] Good resource for large, nation-wide meetings  [x] Can also attend smaller, local meetings in other cities  [x] Countless meetings all day and all night  [x] AA costs nothing to attend; they pass a basket for donations but this is not required    Flying Sober - 24/7 zoom meetings for women and coed - sign on anytime, anywhere!  [email or web address]es/76-0-cpsnkato/    Online Intergroup of AA - 121 Open AA [place] Meeting - 24/7 zoom meetings  https://aa-intergroup.org/meetings/    LOOKING FOR AN ALTERNATIVE TO 12 STEP PROGRAMS - check out:  SMART Recovery: https://www.smartrecovery.org/about-us  [facility] Recovery: https://recoverydharma.org      DETOX UNITS (USUALLY 5-7 DAYS):     [facility] [place] [facility]: [address], [place]  [phone], call first to ensure bed availability    [facility]: [address] [facility] [address] [place]  [phone], Option 1, call first to ensure bed availability    [place] [facility] Recovery Center: [facility]note 2] Dc Aguilera, [place]  [phone] (intake by appointment only)    [facility]: [address], [place]  [phone]      INTENSIVE OUTPATIENT PROGRAMS:     [facility] RECOVERY PROGRAM (formerly known as the ABU)  [x] [phone], Option 2  [x] [address], Chris House 4th Floor, [place] [address]  [x] https://www.ochsner.org/services/ochsner-recovery-program  [x] [facility] delivers comprehensive and collaborative treatment for alcohol and substance use disorders.  Excellent program for working professionals or  anyone else seeking recovery.  [x] Requires insurance approval prior to starting program, call number above for more information.  [x] Intensive Outpatient Rehabilitation Program - M-F 9am-3pm - daily groups with psychologists and social workers, sessions with MDs 3x per week   [x] Ambulatory detox and dual diagnosis available    [facility] Intensive Outpatient Program  [x] [phone]  [x] [address] [facility] (the clinic not [facility])  [x] Call number above for more info and to check insurance requirements    [facility]  [address] [place], [place] [address] [phone]    [facility]:  [address] [facility], [address]?, [place], [place] [address]?, [phone]  [address] [facility]?, [place], [place] [address]?, [phone]    RESIDENTIAL REHABS (USUALLY 28 DAYS):     [facility]: [address], [phone]    [facility]: [address] , [facility]  [phone] (intake by appointment only)    [facility] (men only) [address] [facility], [phone]    [facility] (Female only) [address] [facility], [phone]    [facility]: [address], [facility], men's program [phone], women's program [phone]    [facility]: [address], [facility], [phone]    Responsibility House: [address] Zhang[place], [place], [phone]    [facility]: Men only, [phone], [address] [place] [facility]: [address], [place], [place], [phone]    [facility]: [facility] [place], [place],  [phone]  New Location: [address] [place] [address] 100, [place] [address], [phone]    [facility]:   ?[address]?[place], [place] [address]?[phone]    Greg: [address], [place] [address], [phone]    [place]: [place], [phone]     [facility]: [place], [place], [phone]    [address] [facility]: Ani  NIKOLAS [place], [phone]    [facility]: [place], [place], [phone]    [place]: [employer], [place], [phone]    Nuria [place]: [address], [place] [address], [phone]    COMMUNITY ADDICTION CLINICS:     [facility]: [address] [facility], [address] [place], [phone] -or- [address] [place] [address]    [facility] [place]: [address] [facility], [place]  [address]     [facility]: Clinics [phone]; Crisis [phone]    [facility]: [address] [facility] [place] [address]    [facility]: [address] [place] [address]    [facility]: [address] [place] [address],    [facility]: 2nd Floor [address] [place] [place] [address]    [facility]: [address] [facility] [place] [address]    [facility], [address] [place] [address]    [facility] Center: [address] [place] [address], [place] [phone]  (serves youth 16-23 years old)    [facility]: [facility]/[place] [phone]    [facility]: [address] [facility], [place] [phone]    [facility]: [address], [place] [phone]    [facility]: [address] [facility] [address] [facility], [address], [phone]     [facility]: [address] [facility], [phone], [phone]    RESOURCES IN OTHER [facility]:     [facility]: [address] Arlene [place], [phone], [phone]    [facility]: [address] [facility], [address], [phone]    [facility], [address] [place] [address], [phone]    [facility]: [address], [employer], [phone]    [facility]  "[facility] Health, [address] [facility], [phone] ([facility])    [facility], [address] [facility], [phone] ([facility])    [facility], [address], [address], [place], [phone] ([place], [place], and [facility])    [facility] [facility], [address], [place], [phone] ([facility])    [facility] Hotline [phone], [phone]    [facility], [address] [facility], [facility], [address] [facility], [address], [facility], [address] [facility], [facility] Behavioral [facility], 500 [facility] [address]., [place] [facility], [address], [facility], [address], [address] [phone]    [facility] Human [facility], [address] [facility] [phone]    [facility] for [facility] Recovery, [address] [facility], [phone]    [facility] for Addiction Recovery, [address] [place] [address], [phone]      Chinese SPEAKING (en español):     Información de la reunión de Alcohólicos Anónimos  Jatinder [facility], 10:00 am  Habla español  Esta reunión está abierta y cualquiera puede asistir.    Frisian speaking Alcoholics anonymous meetings:  El "Jatinder Casa AA Skype" es un jatinder on line de Alcohólicos Anónimos en español. El jatinder es karl, gratuito y virtual a través de Skype Audio. El jatinder funciona mediante khris llamada grupal de voz, por lo que no se utiliza la videollamada, ni se pueden rebecca las imágenes o rostros de los participantes. Hace marcela años y medio abrimos el primer Jatinder de AA por Skanastasiyae en [place], michael actualmente asisten personas desde [place], [place], [place], [place], [place], [place], [place], [place], [place], [place], " [place] y [place], entre otros.    El arsalan es muy útil para los alcohólicos que residen en lugares donde no se celebran reuniones de AA, o residen en lugares donde las reuniones de AA son un número limitado de días a la semana, o para aquellos compañeros que se hayan de viaje o que, por cualquier motivo, se hayan convalecientes y no pueden desplazarse. Todos los días nos reuniones a las 21:00 (hora española)    Podéis obtener más información sobre el arsalan y manish sesiones en la págLee Silber web [email or web address]      MENTAL HEALTH/ADDICTIVE DISORDERS:     AA ([phone]), NA ([phone])   National Suicide Prevention Lifeline- Call 1-589.864.9457 Available 24 hours [facility] [phone]; Crisis Line [phone] - Call for options A-F:  Intensive Outpatient Treatment/ Day programs   [facility], please contact   [facility], please contact [phone] or [phone] to speak with an admissions counselor.  [facility] (Methadone Maintenance)   [address] [place] [address], [phone]  [facility] [place] [address] [phone]  [facility], [address] [place] [address], [phone]  [place] [facility] [phone]  [facility] [place] please contact [phone]  [facility], [facility], 4th floor NIKOLAS Landeros [address] Phone: [phone]   Acer  [place] Office: [address] [address], [phone]  [place] Office: [address] [facility] [address], NIKOLAS Landeros [address], [phone]  [place] Office: [address] [facility] [place] [address] [phone]    Outpatient Substance Abuse Treatment   [facility] (Methadone Maintenance)   [facility] [place] [address], [phone]  [address] [place] [address] [phone]  [facility], [address] [place] [address], [phone]  Acer  [place] Office: [address] [place] [address], [phone]  [place] Office: [address] [facility], [address], [place] [address], [phone]  [place] Office: [address] [place] [address] [phone]  [place] [facility], [address] [place] [address] [phone]   [facility] for Addiction Recovery, [address] [facility], [address], [phone]  [facility] for Addiction Recover, [address] [place], [place] [phone]    Residential Substance Abuse Treatment   [facility] [address] [facility], [phone]  [facility], [address] [facility], [phone]  [facility] (men only) [address] [place] [address], [phone]  [facility] at the [facility] (women only) [address] [place] [address] [phone]  [facility], [address] [place] [address] [phone]  [facility] (women only), intakes at [address] [facility] [facility], [phone]  [facility] (7-day program, $100, [address] [place], [phone], [phone], [phone])  [facility] (Men only, [phone]), [address], [place] (Vets*/Non-Vets)  [facility] (Men only, $400/month program fee) [address] [facility] Yuliana Bowman, [phone]  [facility] (Women over age 39 only), [address] [facility], [phone]    Out of Area:    [place], [address] [place] [address] 36, [place], [place] ([phone])  [facility] Recovery Program (men only), [address] [facility] [place] [address], [phone]  [facility], [address] [place], [place] ([phone])  [address] [place], [place] ([phone])  [facility], [address] [place] [address], [phone]  [facility] (Women only, has to have mental illness, can be homeless or substance abuser), [phone]      [place] RESOURCES:     [place] [facility] Crisis Response  Team:    Region 12 ([place], [place], [place], and [facility][1]) ([facility])  [phone]      Outpatient Mental Health & Addiction Clinic Resources for both [facility]:    [facility] Mental Healthcare Resources  Website: www.[facility] Hospital[facility].org  Main Number: 870-138-9213    [facility] ([facility])  [address] ([address] [facility]) [place] [address]  [phone]    [facility] ([facility])  [address] ([address] [facility]) [place] [address]  [phone]    [facility]  [address] ([address]) [place] [address]  [phone]    [facility]  [address] ([address] [facility]) [place] [address]  [phone]      Addiction Treatment Resources for both [facility]:    [facility] (Detox, Residential, PHP, IOP, and Aftercare Programs)  [address] [place], [place] [address] [phone]    [facility] (Residential, IOP, Transitional Living, and Aftercare Programs)  [address] [facility] [place] [address]  [phone]    [facility] (Inpatient, Residential, Detox, IOP, Outpatient, and Aftercare Programs)  [address] [place] [address] [phone] or toll free at [phone]      Outpatient Mental Health Psychotherapy Resources for both [facility]:    Nicole Ruiz, CANDELARIOW  [address]  [place] [address]  [phone]  Specialties: Depression, Anxiety, and Life Transitions    Yudy Vicente, PhD  [address]  [place] [address]  [phone]  Specialties: Testing and Evaluation, Education and Learning Disabilities, and ADHD    Angie Brand, CANDELARIOW [facility] [address] [place] [address]  [phone]  Specialties:  Obsessive-Compulsive (OCD), Depression, and Relationship Issues    Mariah Perdomo, NINA [address] [facility] [place] [address]  [phone]  Specialties: Trauma & PTSD, Mood Disorders, and Anxiety    Mariah Neff, PhD, [facility] [address] [place] [address]  [phone]  Specialties: Family Conflict, Child, and Relationship Issues    Mercedes Desai LPC Counseling [place], [place] [address] [phone]  Specialties: Anxiety, Depression, and Anger Management        IN CASE OF SUICIDAL THINKING, call the [place] Suicide Hotline Number: 988    988 Suicide & Crisis Lifeline: 988 , 6-255-784-TALK (8255)  Provides 24/7, free and confidential support for people in distress, prevention and crisis resources for you or your loved ones, and best practices for professionals.    Call, text or chat.  https://Let's Talk.AOBiome               REFERRAL RECOMMENDATIONS FOR SUBSTANCE ABUSE & MENTAL HEALTH      IN CASE OF SUICIDAL THINKING, call the [place] Suicide Hotline Number: 988    988 Suicide & Crisis Lifeline: 988 , 0-698-548-TALK (8255)  https://Kanoco       SUBSTANCE ABUSE:     [facility] RECOV[facility] (formerly known as the ABU)  [x] [phone], Option 2  [x] [address] [facility] 4th Floor, [facility] [address]  [x] [email or web address]://www.[email or web address].org/services/[email or web address]-recovery-program  [x] [facility] delivers comprehensive and collaborative treatment for alcohol and substance use disorders.  Excellent program for working professionals or anyone else seeking recovery.  [x] Requires insurance approval prior to starting program, call number above for more information.  [x] Intensive Outpatient Rehabilitation Program - M-F 9am-3pm - daily groups with psychologists and social workers, sessions with MDs 3x per week   [x] Ambulatory detox and dual diagnosis available      SUBOXONE:     NOTE: some Suboxone clinics require their clients to  participate in a structured program (such as an IOP) in order to be prescribed Suboxone.  Some clinics have a long waiting list.  Most of these clinics do not accept walk-in clients, so call first to to learn what must be done to get started on Suboxone.    [facility] - [phone] (can do Sublocade)  [address] [facility], [place] [address]    [facility]  [address] [place], [place]  [phone]    [facility] - [phone] (can do Sublocade)  [address] [place] [address]    [facility]  [address] [place]  [phone]     [facility] (very short waiting list, may accept some walk-in's but call first if possible)  [address], [place] [address] [phone]; [phone]    [facility]   [address] [place]    [phone]    [facility] (can usually be seen within a week but is cash only for appointment)  [address] [place] [address] [place], [place]    [facility] ([facility])  [address] [employer] [place] [address]  [phone]    [facility] ([facility])  [address] [employer], [place]  [phone]    [facility] ([facility])  [address] [facility], [place] [address]  [phone]    [place], [place]:    [facility] - [address] [place] [address] - Tel: [phone]    Trev Marshall - [address] [place] [address] - Tel: [phone]    Bala Floyd - [address] [place] [address] - Tel: [phone]    Maid Patterson - [address] [place] [address] - Tel: [phone]    Raffy Garcia - [address] [place] [address] - Tel: [phone]    [place], [place]:     Dr. Brie Rodriguez and Dr. Nicolas Mosher - [address] [facility], [place] - Tel: [phone]    Dr. Fouzia Rainey - [address] [place] - Tel: [phone]    Dr. Bebeto Garcias - Tel: [phone]    Dr. Rico Kiser - [facility] - [phone]      METHADONE:     [facility] (the only methadone  clinic in the city, has two locations)  [x] [place] - [address] [place] [address], [phone]  [x] [facility] [place] [address], [phone]    12 STEP PROGRAMS (and similar):     Alcoholics Anonymous (local)  [x] [phone]  [x] www.aaneworleans.org for schedules for in-person and online meetings  [x] There are AA meetings throughout the day all over town  [x] AA costs nothing to attend; they pass a basket for donations but this is not required    Narcotics Anonymous  [x] [phone]  [x] www.noana.org  [x] There are NA meetings throughout the day all over [facility]  [x] NA costs nothing to attend; they pass a basket for donations but this is not required    Alcoholics Anonymous Online Intergroup (national)  [x] www.aa-intergroup.org  [x] Good resource for large, nation-wide meetings  [x] Can also attend smaller, local meetings in other cities  [x] Countless meetings all day and all night  [x] AA costs nothing to attend; they pass a basket for donations but this is not required    Flying Sober - 24/7 zoom meetings for women and coed - sign on anytime, anywhere!  [email or web address]/79-6-kqdvwtcc/    Online Intergroup of AA - 121 Open AA [place] Meeting - 24/7 zoom meetings  https://aa-intergroup.org/meetings/    LOOKING FOR AN ALTERNATIVE TO 12 STEP PROGRAMS - check out:  [facility]ART Recovery: https://www.smartrecovery.org/about-us  [facility]: https://recoverydharma.org      DETOX UNITS (USUALLY 5-7 DAYS):     [place] [facility]: [address], [place]  [phone], call first to ensure bed availability    [facility]: [address] [place]  [phone], Option 1, call first to ensure bed availability    [facility] Recovery [facility]: [address], [place]  [phone] (intake by appointment only)    [facility]: [address], [place]  [phone]      INTENSIVE OUTPATIENT PROGRAMS:     [facility] REC[facility] (formerly known as the  ABU)  [x] [phone], Option 2  [x] [address], [facility] 4th Floor, [facility] [address]  [x] https://www.[email or web address].org/services/ochsner-recovery-program  [x] [facility] delivers comprehensive and collaborative treatment for alcohol and substance use disorders.  Excellent program for working professionals or anyone else seeking recovery.  [x] Requires insurance approval prior to starting program, call number above for more information.  [x] Intensive Outpatient Rehabilitation Program - M-F 9am-3pm - daily groups with psychologists and social workers, sessions with MDs 3x per week   [x] Ambulatory detox and dual diagnosis available    [facility] Intensive Outpatient Program  [x] [phone]  [x] [facility] (the clinic not [facility])  [x] Call number above for more info and to check insurance requirements    [facility]  [address] [place] [address] [phone]    [facility]:  [address] [facility], [address]?, [place], [place] [address]?, [phone]  [address] [facility]?, [place], [place] [address]?, [phone]    RESIDENTIAL REHABS (USUALLY 28 DAYS):     [facility]: [address], [phone]    [facility] Recovery Center: [address] [place] , [facility]  [phone] (intake by appointment only)    [facility] (men only) [address] [facility], [phone]    [facility] (Female only) [address] [facility], [phone]    [facility]: [address], [facility], men's program [phone], women's program [phone]    [facility]: [address], [facility], [phone]    [facility]ibility [facility]: [address], [place], [phone]    [facility]: Men only, [phone], [address] [place]    [facility]: [address], [place], [place], [phone]    [facility]: [address] [place] [address],  [phone]  New Location: [address] [place] [address]  100, [place] [address], [phone]    [facility]:   ?[address]?[place], [place] [address]?[phone]    Greg: [address], [place] [address], [phone]    [place]: [place], [phone]     [facility]: [place], [place], [phone]    [facility] [facility]: [place], [phone]    State mental health facility: [place], [place], [phone]    [place]: [place], [phone]    [facility]: [address] [place] [address], [phone]    COMMUNITY ADDICTION CLINICS:     [facility]: [address] [facility], [facility] [place] -[phone] -or- [address] [place] [address]    [facility] [place]: [address] [place]  [address]     [facility]: Clinics [phone]; Crisis [phone]    [facility]: [address] [place] [address]    [facility] Center: [address] [place] [address]    [facility]: [address] [place] [address],    [facility]: 2nd Floor [address] [place] [address]    [facility]: [address], [place] [address]    [facility], [place] [address]    [facility]: [address] [place] [address] [phone]  (serves youth 16-23 years old)    [facility]: [facility] [phone]    [facility]: [address] [facility], [place] [phone]    [facility] [facility]: [address] [facility] [phone]    [facility] [place] [facility]: [address] [facility], [address], [phone]     [facility] [place] [facility]: [address] [facility] Kristie Couch, [phone], [phone]    RESOURCES IN OTHER [facility]:     [facility]: [facility] F. Gerhard Madsen, [place]  "[place], [phone], [phone]    [facility]: [address] [facility], [address], [phone]    [facility], [address] [place] [address], [phone]    [facility]: [address] [facility], [phone]    [facility], [address] [facility], [phone] (State mental health facility)    [facility], [address] [facility], [phone] ([facility])    [facility], [address] [facility], [address], [place], [phone] ([facility], [facility])    [facility], [address] [facility], [phone] ([facility])    [facility] Hotline [phone], [phone]    [facility], [address] [facility], [facility], 232 [facility]3], [address], [facility] Behavioral [facility], [address] [facility] Behavioral [facility], 500 [facility]. [address]., [facility] Behavioral [facility], [address], [facility] Human [facility], [address], [address] [phone]    [facility] Human Services, [address] [facility] [phone]    [facility], [address] [facility], [phone]    [facility] for Addiction Recovery, 85 [facility], [phone]      Amharic SPEAKING (en español):     Información de la reunión de Alcohólicos Anónimos  Jatinder [facility], 10:00 am  Habla español  Esta reunión está abierta y cualquiera puede asistir.    Tuvaluan speaking Alcoholics anonymous meetings:  El "Jatinder [place] AA Skype" es un jatinder on line de Alcohólicos Anónimos en español. El jatinder es karl, gratuito y virtual a través de Skype Audio. El jatinder funciona mediante khris llamada grupal de " voz, por lo que no se utiliza la videollamada, ni se pueden rebecca las imágenes o rostros de los participantes. Hace marcela años y medio abrimos el primer Jatinder de AA por [place] en [place], michael actualmente asisten personas desde [place], [place], [place], [place], [place], [place], [place], [place], [place], [place], [place] y [place], entre otros.    El jatinder es muy útil para los alcohólicos que residen en lugares donde no se celebran reuniones de AA, o residen en lugares donde las reuniones de AA son un número limitado de días a la semana, o para aquellos compañeros que se hayan de viaje o que, por cualquier motivo, se hayan convalecientes y no pueden desplazarse. Todos los días nos reuniones a las 21:00 (hora española)    Podéis obtener más información sobre el jatinder y manish sesiones en la página web [email or web address]      MENTAL HEALTH:     [facility] Out[facility]  [phone]    [facility] - General Psychiatry Intensive Outpatient Program  [facility] (formerly known as the BMU)  [phone], option 3    [facility] - Dual Diagnosis Intensive Outpatient Program  [facility] (formerly known as the ABU)  [phone], option 2      COMMUNITY MENTAL HEALTH CENTERS:     [facility]  (aka [facility])  Serves [facility], and [facility] residents.  Serves uninsured patients & those with Medicaid.  Main location: [address] [place] [address]  [phone]  Walk-in's available during regular business hours.  24/7 Crisis Line: [phone]    [facility]  (aka [facility])  Serves [facility].  Serves uninsured patients, those with Medicaid and some private plans.  Walk-in's available during regular business hours.  Primary care services available  as well.  [place]: [address] [facility] [address] [place] [address];  [phone]  [place]: [address] [place] [address];  [phone]  24/7 Crisis Line: [phone]    [facility]  Serves uninsured patients & those with Medicaid, call for more info.  Primary care, pediatrics, HIV treatment, and dentistry services available as well.  Three locations.  [phone]    [facility] [place]Corporate Office  Serves patients with Medicaid, Medicare, and private insurance  [address]  [place] [address]  [phone]    [facility]  Serves uninsured on a sliding scale, as well as Medicaid, Medicare, and private plans.  Eight locations around the [facility].  [phone]    [facility]  Serves uninsured patients & those with Medicaid, private insurances.  Primary care available as well.  [phone]  [address] [place] [address]    [facility]  Serves veterans who were honorably discharged.  [address] [place] [address]  [phone]  24/7 Veterans Crisis Line: [phone] (Press 1)    If you have private insurance and need to find a specialist, please contact your insurance network to request a list of providers covered by your benefits.      MENTAL HEALTH/ADDICTIVE DISORDERS:     AA ([phone]), NA ([phone])   National Suicide Prevention Lifeline- Call 1-891.951.7006 Available 24 hours everyday  [facility] [phone]; Crisis Line 722-3364 - Call for options A-F:  Intensive Outpatient Treatment/ Day programs   [facility], please contact   [facility], please contact [phone] or [phone] to speak with an admissions counselor.  [facility] Group (Methadone Maintenance)   [address] [place] [address], [phone]  [address] [place] [address] [phone]  [facility], [address] [place] [address], [phone]  [place] [facility] [phone]  [facility] please contact [phone]  [facility], [address], 4th [place] [address] Phone: [phone]   [facility]  [place] Office: [address] [place] [address], [phone]  [place] Office: [address] [facility], [address], [place] [address], [phone]  [place] Office: [address] [place], [place] [address] [phone]    Outpatient Substance Abuse Treatment   [facility] Group (Methadone Maintenance)   [address] [place] [address], [phone]  [address] [place] [address] [phone]  [facility], [address] [place] [address], [phone]  [facility]  [place] Office: [address] [address], [phone]  [place] Office: [address] [facility], [address], [place] [address], [phone]  [place] Office: [address] [place] [address] [phone]  [facility], [address] [place] [address] [phone]   [facility], [address] [facility], [address], [phone]  [facility]over, [address] [place], [place] [phone]    Residential Substance Abuse Treatment   [facility] [address] [facility], [phone] or [phone]  [facility], [address] [facility], [phone]  [facility] (men only) [address] [place] [address], [place] [address], [phone]  [facility] (women only) [address] [place] [address] [phone]  [facility], [address] [place] [address] [phone]  [facility] (women only), intakes at [address] [facility], [phone]  [facility] (7-day program, $100, [address], [phone], [phone], [phone])  [facility]  (Men only, [phone]), [address] (Vets*/Non-Vets)  [facility] (Men only, $400/month program fee) [address], [phone]  [facility] (Women over age 39 only), [address] [facility], [phone]    Out of Area:    [place], [address], [place], [place] [phone])  [facility] Recovery Program (men only), [address] [facility] [place] [address], [phone]  [facility], [address] [place], [place] [phone])  [place], [facility] [place], [place] ([phone])  [facility] Addiction [facility], [address] [facility], [phone]  [facility] (Women only, has to have mental illness, can be homeless or substance abuser), [phone]        DOMESTIC VIOLENCE RESOURCES:     Advocacy  [facility] ([facility])  [address] [address]    [facility] ? [phone]  Services provided: emergency shelter, individual advocacy, information and referrals, group support, children's program, medical advocacy, forensic medical exams, primary care, legal assistance, counseling, safety planning, and caregiver support    [facility] HEALING AND EMPOWERMENT [place]  Confidential location  [facility] ? [phone]  Services provided: short term emergency shelter, all services provided are free of charge    [facility] CENTERS FOR COMMUNITY ADVOCACY  Multiple locations in [place], [facility], [place], [facility], [place], and [facility] ([place], [place], and [place])    [place] ? [phone]  Services provided: emergency shelter, individual advocacy, information and referrals, group support, children's program, medical advocacy, legal assistance in obtaining restraining orders, counseling, safety planning, and caregiver support    [facility]   Emergency Shelter   [phone]  Emergency Services ,Legal and Financial Assistance Services ,Housing Services ,Support Services      [facility]ial   [phone]  Emergency Services ,Counseling Services , Housing Services ,Support Services ,Children's Services     WOMEN WITH A VISION  [address] [place] [address]  WWAV ? [phone]  Services provided: advocacy, health education and supportive services, specializing in free healing services for marginalized groups, including LGBTQ individuals and sex workers    SEXUAL TRAUMA AWARENESS AND RESPONSE (STAR)  [address] [place] [address]    STAR ? (406) 429-STAR  Services provided: individual advocacy, information and referrals, group support, medical advocacy, legal assistance, counseling, and safety planning for survivors of sexual assault    [facility] ([facility])  [address] [place] [address]  [facility] Forensic Program ? [phone]  Services provided: free forensic medical exams for sexual assault and domestic violence, which can be performed up to 5 days after an incident. It is not necessary to make a police report to receive a forensic medical exam    Legal  [facility]  [address] [address]  [facility] ? [phone] [phone]  Services provided: free emergency legal representation for survivors of doemstic violence residing in [facility]. Legal services may include temporary restraining orders, temporary child support, custody, and use of property    [facility] LEGAL SERVICES (LS)  [address] [address]  SLLS ? [phone]  Services provided: free legal representation for survivors of domestic violence residing in [facility]. Legal services may include temporary child support, custody, and divorce      HOTLINES:     [facility] DOMESTIC VIOLENCE HOTLINE  [phone]    Services provided: free and confidential hotline for victims and survivors of domestic violence. All calls will be routed to a domestic violence service provided in the  victim or survivor's area    NATIONAL HUMAN TRAFFICKING HOTLINE  ([phone]) [phone]    Services provided: national anti-trafficking hotline serving victims and survivors of human trafficking. Provides information about local resources, and access to safe space to report tips, seek services, and ask for help    VIA LINK  211 or ([phone]    Service provided: counselors can provide crisis counseling. Counselors can also provide information and referrals to programs which can help with needs such as food, shelter, medical care, financial assistance, mental health services, substance abuse treatment, senior services, childcare, and more      HOMELESS SHELTERS:      Homeless shelters  [facility]  Emergency shelter for individuals and families  [address] [facility]  [phone]  [facility]  Emergency shelter for men only  Meals daily 6am, 2pm, & 6pm  Clothing, case management M-F by appointment (ID/job/housing/legal assistance), mail  [address] [facility]  [phone]  [facility]  Emergency shelter for men  [address] Monique [facility]  [phone]  Emergency shelter for women  [address] [facility]  [phone]  Breakfast & lunch daily, dinner M-F  Case management, job counseling services   [facility]  Emergency shelter for teens and young adults up to 22yo  [address] [facility]  [phone]  [facility]  Emergency shelter for women over 19yo and their kids  [address] [place] [address]  [phone]  [facility]  Day program, meals M-F 1PM (arrive early)  Showers, laundry, hygiene kits, showers, phones, , notary services, case management, ID assistance  [address] [facility]  [phone] M-F 8am-2:30pm  [facility]  Day program  MTWF 7:30am-3:30pm,  8:30am-3:30pm  Crisis intervention, employment assistance, food/clothing, hygiene kits, bus tokens, mail  [address] [facility]  [phone]  [facility]  Mobile outreach for homeless persons in  [facility]  [phone]  Healthcare for the Homeless  Primary healthcare, case management, dental services, TB placement  Call ahead  [address]  2nd Floor  [phone]  [place] [facility]  Connects homeless people with their loved ones in other cities by providing transportation costs   [phone]      [place] RESOURCES:     [place] Mobile Mental Health Crisis Response Team:    Region 12 ([place], [place], [place], and [facility]) ([facility])  [phone]      Outpatient Mental Health & Addiction Clinic Resources for both [facility]:    [facility] Mental Healthcare Resources  Website: www.[facility].org  Main Number: 370-367-8266    [facility] ([facility])  [address] ([address]) [place] [address]  [phone]    [facility] ([facility])  [address] ([address] [facility]) [place] [address]  [phone]    [facility]  [address] ([address]) [place] [address]  [phone]    [facility]  [address] ([address] [facility] [place] [address]  [phone]      Addiction Treatment Resources for both [facility]:    [facility] (Detox, Residential, PHP, IOP, and Aftercare Programs)  [address] [place] [address]  [phone]    [facility] (Residential, IOP, Transitional Living, and Aftercare Programs)  [address] [place] [address]  [phone]    [facility] (Inpatient, Residential, Detox, IOP, Outpatient, and Aftercare Programs)  [address] [facility], [place] [address] [phone] or toll free at [phone]      Outpatient Mental Health Psychotherapy Resources for both [facility]:    Nicole Ruiz, [facility]  [address]  [place] [address]  [phone]  Specialties: Depression, Anxiety, and Life Transitions    Yudy Vicente, PhD  [address]  [place] [address]  [phone]  Specialties: Testing and Evaluation, Education and Learning Disabilities, and ADHD    Angie Brand, [facility] [address] [place] [address]  [phone]  Specialties: Obsessive-Compulsive (OCD), Depression, and Relationship Issues    Mariah Perdomo [facility] [address] [facility] [place] [address]  [phone]  Specialties: Trauma & PTSD, Mood Disorders, and Anxiety    Mariah Neff, PhD, [facility]  [facility] [address] [place] [address]  [phone]  Specialties: Family Conflict, Child, and Relationship Issues    Mercedes Desai [facility]ing [place] [address] [phone]  Specialties: Anxiety, Depression, and Anger Management        IN CASE OF SUICIDAL THINKING, call the National Suicide Hotline Number: 988    988 Suicide & Crisis Lifeline: 988 , 4-615-272-TALK (8255)  Provides 24/7, free and confidential support for people in distress, prevention and crisis resources for you or your loved ones, and best practices for professionals.    Call, text or chat.  https://988gauzzline.org

## 2024-08-22 NOTE — PLAN OF CARE
Problem: Adult Inpatient Plan of Care  Goal: Plan of Care Review  Outcome: Progressing  Goal: Patient-Specific Goal (Individualized)  Outcome: Progressing  Goal: Absence of Hospital-Acquired Illness or Injury  Outcome: Progressing  Intervention: Identify and Manage Fall Risk  Flowsheets (Taken 8/22/2024 1732)  Safety Promotion/Fall Prevention:   assistive device/personal item within reach   bed alarm refused   Fall Risk reviewed with patient/family   Fall Risk signage in place   nonskid shoes/socks when out of bed   side rails raised x 2  Intervention: Prevent and Manage VTE (Venous Thromboembolism) Risk  Flowsheets (Taken 8/22/2024 1732)  VTE Prevention/Management:   bleeding risk assessed   bleeding precations maintained   fluids promoted   ROM (active) performed  Goal: Optimal Comfort and Wellbeing  Outcome: Progressing  Goal: Readiness for Transition of Care  Outcome: Progressing     POC reviewed. Address questions and concerns. AAOX4. Prn for hypertension. CIWA<20.  Frequent safety checks. Call light in reach. Bed in lowest position.

## 2024-08-23 VITALS
HEIGHT: 65 IN | DIASTOLIC BLOOD PRESSURE: 100 MMHG | RESPIRATION RATE: 18 BRPM | TEMPERATURE: 98 F | HEART RATE: 98 BPM | OXYGEN SATURATION: 98 % | WEIGHT: 140 LBS | BODY MASS INDEX: 23.32 KG/M2 | SYSTOLIC BLOOD PRESSURE: 141 MMHG

## 2024-08-23 LAB
ALBUMIN SERPL BCP-MCNC: 3.1 G/DL (ref 3.5–5.2)
ALP SERPL-CCNC: 48 U/L (ref 55–135)
ALT SERPL W/O P-5'-P-CCNC: 81 U/L (ref 10–44)
ANION GAP SERPL CALC-SCNC: 11 MMOL/L (ref 8–16)
AST SERPL-CCNC: 77 U/L (ref 10–40)
BASOPHILS # BLD AUTO: 0.05 K/UL (ref 0–0.2)
BASOPHILS NFR BLD: 0.9 % (ref 0–1.9)
BILIRUB SERPL-MCNC: 1.9 MG/DL (ref 0.1–1)
BUN SERPL-MCNC: 11 MG/DL (ref 6–20)
CALCIUM SERPL-MCNC: 8.9 MG/DL (ref 8.7–10.5)
CHLORIDE SERPL-SCNC: 105 MMOL/L (ref 95–110)
CO2 SERPL-SCNC: 20 MMOL/L (ref 23–29)
CREAT SERPL-MCNC: 0.7 MG/DL (ref 0.5–1.4)
DIFFERENTIAL METHOD BLD: ABNORMAL
EOSINOPHIL # BLD AUTO: 0.2 K/UL (ref 0–0.5)
EOSINOPHIL NFR BLD: 3.6 % (ref 0–8)
ERYTHROCYTE [DISTWIDTH] IN BLOOD BY AUTOMATED COUNT: 12.2 % (ref 11.5–14.5)
EST. GFR  (NO RACE VARIABLE): >60 ML/MIN/1.73 M^2
GLUCOSE SERPL-MCNC: 105 MG/DL (ref 70–110)
HCT VFR BLD AUTO: 46.3 % (ref 40–54)
HGB BLD-MCNC: 16.5 G/DL (ref 14–18)
IMM GRANULOCYTES # BLD AUTO: 0.03 K/UL (ref 0–0.04)
IMM GRANULOCYTES NFR BLD AUTO: 0.6 % (ref 0–0.5)
LYMPHOCYTES # BLD AUTO: 2.2 K/UL (ref 1–4.8)
LYMPHOCYTES NFR BLD: 42.1 % (ref 18–48)
MCH RBC QN AUTO: 35.7 PG (ref 27–31)
MCHC RBC AUTO-ENTMCNC: 35.6 G/DL (ref 32–36)
MCV RBC AUTO: 100 FL (ref 82–98)
MONOCYTES # BLD AUTO: 0.7 K/UL (ref 0.3–1)
MONOCYTES NFR BLD: 12.8 % (ref 4–15)
NEUTROPHILS # BLD AUTO: 2.1 K/UL (ref 1.8–7.7)
NEUTROPHILS NFR BLD: 40 % (ref 38–73)
NRBC BLD-RTO: 0 /100 WBC
PLATELET # BLD AUTO: 123 K/UL (ref 150–450)
PMV BLD AUTO: 10.9 FL (ref 9.2–12.9)
POTASSIUM SERPL-SCNC: 3.5 MMOL/L (ref 3.5–5.1)
PROT SERPL-MCNC: 6.9 G/DL (ref 6–8.4)
RBC # BLD AUTO: 4.62 M/UL (ref 4.6–6.2)
SODIUM SERPL-SCNC: 136 MMOL/L (ref 136–145)
WBC # BLD AUTO: 5.32 K/UL (ref 3.9–12.7)

## 2024-08-23 PROCEDURE — 80053 COMPREHEN METABOLIC PANEL: CPT | Performed by: HOSPITALIST

## 2024-08-23 PROCEDURE — S4991 NICOTINE PATCH NONLEGEND: HCPCS

## 2024-08-23 PROCEDURE — 25000003 PHARM REV CODE 250: Performed by: HOSPITALIST

## 2024-08-23 PROCEDURE — 36415 COLL VENOUS BLD VENIPUNCTURE: CPT | Performed by: HOSPITALIST

## 2024-08-23 PROCEDURE — 25000003 PHARM REV CODE 250: Performed by: INTERNAL MEDICINE

## 2024-08-23 PROCEDURE — 85025 COMPLETE CBC W/AUTO DIFF WBC: CPT | Performed by: HOSPITALIST

## 2024-08-23 PROCEDURE — 25000003 PHARM REV CODE 250

## 2024-08-23 RX ORDER — DIAZEPAM 5 MG/1
5 TABLET ORAL ONCE
Status: DISCONTINUED | OUTPATIENT
Start: 2024-08-23 | End: 2024-08-23

## 2024-08-23 RX ORDER — DIAZEPAM 5 MG/1
5 TABLET ORAL EVERY 6 HOURS PRN
Qty: 8 TABLET | Refills: 0 | Status: SHIPPED | OUTPATIENT
Start: 2024-08-23

## 2024-08-23 RX ORDER — FOLIC ACID 1 MG/1
1 TABLET ORAL DAILY
Qty: 30 TABLET | Refills: 0 | Status: SHIPPED | OUTPATIENT
Start: 2024-08-24 | End: 2025-08-24

## 2024-08-23 RX ORDER — DIAZEPAM 5 MG/1
5 TABLET ORAL ONCE
Status: COMPLETED | OUTPATIENT
Start: 2024-08-23 | End: 2024-08-23

## 2024-08-23 RX ORDER — LANOLIN ALCOHOL/MO/W.PET/CERES
100 CREAM (GRAM) TOPICAL DAILY
Qty: 30 TABLET | Refills: 0 | Status: SHIPPED | OUTPATIENT
Start: 2024-08-24

## 2024-08-23 RX ORDER — IBUPROFEN 200 MG
1 TABLET ORAL DAILY
Qty: 30 PATCH | Refills: 0 | Status: SHIPPED | OUTPATIENT
Start: 2024-08-24

## 2024-08-23 RX ADMIN — DIAZEPAM 5 MG: 5 TABLET ORAL at 02:08

## 2024-08-23 RX ADMIN — PROPRANOLOL HYDROCHLORIDE 10 MG: 10 TABLET ORAL at 09:08

## 2024-08-23 RX ADMIN — FOLIC ACID 1 MG: 1 TABLET ORAL at 09:08

## 2024-08-23 RX ADMIN — DIAZEPAM 5 MG: 5 TABLET ORAL at 12:08

## 2024-08-23 RX ADMIN — NICOTINE 1 PATCH: 14 PATCH, EXTENDED RELEASE TRANSDERMAL at 09:08

## 2024-08-23 RX ADMIN — Medication 100 MG: at 09:08

## 2024-08-23 RX ADMIN — CLONIDINE HYDROCHLORIDE 0.1 MG: 0.1 TABLET ORAL at 12:08

## 2024-08-23 RX ADMIN — DIAZEPAM 5 MG: 5 TABLET ORAL at 05:08

## 2024-08-23 RX ADMIN — THERA TABS 1 TABLET: TAB at 09:08

## 2024-08-23 NOTE — NURSING
Patient being discharge to home. Medications called to outside pharmacy. Remove IV access. Son @bedside.

## 2024-08-23 NOTE — DISCHARGE SUMMARY
[facility] - Telemetry [facility] Medicine  Discharge Summary      Patient Name: Armando Green  MRN: [id number]  ERIK: [id number]  Patient Class: IP- Inpatient  Admission Date: 8/20/2024  Hospital Length of Stay: 3 days  Discharge Date and Time:  08/23/2024 3:17 PM  Attending Physician: Ed Dominguez MD   Discharging Provider: Ed Dominguez MD  Primary Care Provider: Agustin Farnsworth MD  [facility] Medicine Team: [facility]OSP MED  Ed Dominguez MD  Primary Care Team: [facility] MED     HPI:   58 yo M with PMHx alcohol abuse who presents to the ED with police from [facility] for alcohol withdrawal concerns and delirium. Pt. Reports was taken to psych appointment by son today, but the staff at the clinic noted pt. Delirious, sweating, tremulous. He reportedly started becoming aggressive and was unable to be reasoned with. Provider recommended he be treated for alcohol withdrawal after obtaining collateral from son. He was placed under PEC and police were called and brought patient to the ED. At time of my assessment, pt. Angry about being in the ED, but he denies any SI or HI. He does not display psychotic behavior and appears alert and oriented x 4, but sleepy after being given IV ativan.    Per notes from [facility] today: Since his last visit, he has been tying to detox by reducing his alcohol intake.he wakes up daily with tremors and shakes, has been vomiting every morning, very anxious, has to drink as soon as he gets up. He has been missing days of workUsual estimated consumption estimated to be just over 1/5 daily. He has been drinking for many years, has never been to rehab. He has never had hospitalization from withdrawals, no reported seizures.    Psych hx, previously hospitalized in for 10 days at [place] in early 2016 for paranoid delusions, was put on Zyprexa and Depakote. He has been on Zyprexa and Depakote since then.      * No surgery found *      Hospital Course:     Norma was admitted to hospital medicine for alcohol withdrawal. Started on valium taper. Psychatiry consulted, PEC in place on admit which was rescinded by . Patient has remained HDS with minimal etoh withdrawal symptoms while on valium taper. He is medically cleared for discharge home and plans to attend AA following discharge. I will give rx for valium PRN x 2 days as well as addiction psych referral.    Goals of Care Treatment Preferences:  Code Status: Full Code      SDOH Screening:  The patient was screened for utility difficulties, food insecurity, transport difficulties, housing insecurity, and interpersonal safety and there were no concerns identified this admission.     Consults:   Consults (From admission, onward)          Status Ordering Provider     Inpatient consult to Psychiatry  Once        Provider:  (Not yet assigned)    Completed ALEC LINARES          Physical Exam  Vitals reviewed.   Constitutional:       General: He is not in acute distress.     Appearance: He is well-developed.   HENT:      Head: Normocephalic and atraumatic.   Eyes:      Extraocular Movements: Extraocular movements intact.      Pupils: Pupils are equal, round, and reactive to light.   Neck:      Vascular: No JVD.      Trachea: No tracheal deviation.   Cardiovascular:      Rate and Rhythm: Normal rate and regular rhythm.      Heart sounds: No murmur heard.     No friction rub. No gallop.   Pulmonary:      Effort: No respiratory distress.      Breath sounds: Normal breath sounds. No wheezing or rales.   Abdominal:      General: Bowel sounds are normal. There is no distension.      Palpations: Abdomen is soft. There is no mass.      Tenderness: There is no abdominal tenderness.   Musculoskeletal:         General: No deformity.      Cervical back: Neck supple.   Lymphadenopathy:      Cervical: No cervical adenopathy.   Skin:     General: Skin is warm and dry.      Findings: No rash.   Neurological:      Mental Status:  He is alert and oriented to person, place, and time.      Motor: Tremor present.     No new Assessment & Plan notes have been filed under this hospital service since the last note was generated.  Service: Hospital Medicine    Final Active Diagnoses:    Diagnosis Date Noted POA    PRINCIPAL PROBLEM:  Alcohol withdrawal [F10.939] 08/20/2024 Unknown    Transaminitis [R74.01] 08/21/2024 Yes    Psychiatric disturbance [F99] 08/20/2024 Yes      Problems Resolved During this Admission:       Discharged Condition: good    Disposition: Home or Self Care    Follow Up:   Follow-up Information       [facility] Follow up on 9/4/2024.    Why: Established pt's hospital f/u visit @ 2:00pm. Please bring discharge summary, ID, insurance card, and medication list.  Contact information:  [facility] [facility]  [address]  [place] [address] [phone]                         Patient Instructions:      Ambulatory referral/consult to Addiction Psychiatry   Standing Status: Future   Referral Priority: Routine Referral Type: Psychiatric   Referral Reason: Specialty Services Required   Requested Specialty: Addiction Medicine   Number of Visits Requested: 1     Ambulatory referral/consult to Smoking Cessation Program   Standing Status: Future   Referral Priority: Routine Referral Type: Consultation   Referral Reason: Specialty Services Required   Requested Specialty: CTTS   Number of Visits Requested: 1     Diet Adult Regular     Notify your health care provider if you experience any of the following:  temperature >100.4     Notify your health care provider if you experience any of the following:  persistent nausea and vomiting or diarrhea     Notify your health care provider if you experience any of the following:  severe uncontrolled pain     Notify your health care provider if you experience any of the following:  difficulty breathing or increased cough     Notify your health care provider if you experience any of the  following:  persistent dizziness, light-headedness, or visual disturbances     Notify your health care provider if you experience any of the following:  increased confusion or weakness     Reason for not Prescribing Nicotine Replacement     Order Specific Question Answer Comments   Reason for not Prescribing: Patient refused      Activity as tolerated       Significant Diagnostic Studies: Labs: All labs within the past 24 hours have been reviewed    Pending Diagnostic Studies:       None           Medications:  Reconciled Home Medications:      Medication List        START taking these medications      diazePAM 5 MG tablet  Commonly known as: VALIUM  Take 1 tablet (5 mg total) by mouth every 6 (six) hours as needed for Anxiety (alcohol withdrawal).     folic acid 1 MG tablet  Commonly known as: FOLVITE  Take 1 tablet (1 mg total) by mouth once daily.  Start taking on: August 24, 2024     multivitamin Tab  Take 1 tablet by mouth once daily.  Start taking on: August 24, 2024     nicotine 14 mg/24 hr  Commonly known as: NICODERM CQ  Place 1 patch onto the skin once daily.  Start taking on: August 24, 2024     thiamine 100 MG tablet  Take 1 tablet (100 mg total) by mouth once daily.  Start taking on: August 24, 2024            CONTINUE taking these medications      divalproex 500 MG Tbec  Commonly known as: DEPAKOTE  Take 250 mg by mouth every evening.     OLANZapine zydis 10 MG Tbdl  Commonly known as: ZyPREXA  Take 10 mg by mouth every evening.     propranoloL 10 MG tablet  Commonly known as: INDERAL  Take 10 mg by mouth 2 (two) times daily as needed.            STOP taking these medications      chlordiazepoxide 25 MG Cap  Commonly known as: LIBRIUM     ibuprofen 600 MG tablet  Commonly known as: ADVIL,MOTRIN     mupirocin 2 % ointment  Commonly known as: BACTROBAN              Indwelling Lines/Drains at time of discharge:   Lines/Drains/Airways       None                   Time spent on the discharge of patient: 35  minutes         Ed Dominguez MD  Department of Hospital Medicine  [facility] - Telemetry Stepdown

## 2024-08-23 NOTE — PLAN OF CARE
[facility] - Telemetry Stepdown  Discharge Final Note    Primary Care Provider: Agustin Farnsworth MD    Expected Discharge Date: 8/23/2024      Patient discharged to home with son Goyo. Follow up appointments scheduled and placed in AVS. Discharge Plan A and Plan B have been determined by review of patient's clinical status, future medical and therapeutic needs, and coverage/benefits for post-acute care in coordination with multidisciplinary team members.    Final Discharge Note (most recent)       Final Note - 08/23/24 1648          Final Note    Assessment Type Final Discharge Note (P)      Anticipated Discharge Disposition Home or Self Care (P)      Hospital Resources/Appts/Education Provided Appointments scheduled and added to AVS (P)         Post-Acute Status    Coverage Medicaid (P)      Discharge Delays None known at this time (P)                      Important Message from Medicare             Contact Info       [address]  [address]  [place] [address] [phone]       Next Steps: Follow up on 9/4/2024    Instructions: Established pt's hospital f/u visit @ 2:00pm. Please bring discharge summary, ID, insurance card, and medication list.            LAVINIA Rice  Case Management  [phone]

## 2024-10-23 ENCOUNTER — PATIENT MESSAGE (OUTPATIENT)
Dept: RESEARCH | Facility: HOSPITAL | Age: 59
End: 2024-10-23
Payer: MEDICAID

## (undated) DEVICE — DRESSING AQUACEL SACRAL 9 X 9

## (undated) DEVICE — ELECTRODE REM PLYHSV RETURN 9

## (undated) DEVICE — KIT WING PAD POSITIONING

## (undated) DEVICE — MANIFOLD 4 PORT

## (undated) DEVICE — SUT MCRYL PLUS 4-0 PS2 27IN

## (undated) DEVICE — OBTURATOR BLADELESS 8MM XI CLR

## (undated) DEVICE — NDL HYPO REG 25G X 1 1/2

## (undated) DEVICE — BANDAGE ADHESIVE

## (undated) DEVICE — SEAL UNIVERSAL 5MM-8MM XI

## (undated) DEVICE — GOWN SURGICAL XX LARGE X LONG

## (undated) DEVICE — COVER TIP CURVED SCISSORS XI

## (undated) DEVICE — SEE MEDLINE ITEM 156952

## (undated) DEVICE — GLOVE SURGICAL LATEX SZ 8

## (undated) DEVICE — DRAPE ARM DAVINCI XI

## (undated) DEVICE — SUT VLOC 90 3-0 V-20 NDL 6

## (undated) DEVICE — NDL INSUF ULTRA VERESS 120MM